# Patient Record
Sex: MALE | Race: WHITE | Employment: STUDENT | URBAN - METROPOLITAN AREA
[De-identification: names, ages, dates, MRNs, and addresses within clinical notes are randomized per-mention and may not be internally consistent; named-entity substitution may affect disease eponyms.]

---

## 2024-05-06 ENCOUNTER — APPOINTMENT (OUTPATIENT)
Dept: GENERAL RADIOLOGY | Facility: HOSPITAL | Age: 19
End: 2024-05-06
Attending: PEDIATRICS
Payer: COMMERCIAL

## 2024-05-06 ENCOUNTER — HOSPITAL ENCOUNTER (EMERGENCY)
Facility: HOSPITAL | Age: 19
Discharge: HOME | End: 2024-05-06
Attending: PEDIATRICS
Payer: COMMERCIAL

## 2024-05-06 VITALS
HEART RATE: 70 BPM | HEIGHT: 67 IN | WEIGHT: 135 LBS | OXYGEN SATURATION: 100 % | BODY MASS INDEX: 21.19 KG/M2 | RESPIRATION RATE: 16 BRPM | DIASTOLIC BLOOD PRESSURE: 81 MMHG | SYSTOLIC BLOOD PRESSURE: 142 MMHG | TEMPERATURE: 98 F

## 2024-05-06 DIAGNOSIS — R06.00 DYSPNEA, UNSPECIFIED TYPE: Primary | ICD-10-CM

## 2024-05-06 LAB
ALBUMIN SERPL-MCNC: 4.7 G/DL (ref 3.4–5)
ALBUMIN/GLOB SERPL: 1.3 {RATIO} (ref 1–2)
ALP LIVER SERPL-CCNC: 76 U/L
ALT SERPL-CCNC: 18 U/L
ANION GAP SERPL CALC-SCNC: 7 MMOL/L (ref 0–18)
AST SERPL-CCNC: 18 U/L (ref 15–37)
BASOPHILS # BLD AUTO: 0.04 X10(3) UL (ref 0–0.2)
BASOPHILS NFR BLD AUTO: 0.4 %
BILIRUB SERPL-MCNC: 0.7 MG/DL (ref 0.1–2)
BUN BLD-MCNC: 12 MG/DL (ref 9–23)
CALCIUM BLD-MCNC: 9.7 MG/DL (ref 8.5–10.1)
CHLORIDE SERPL-SCNC: 110 MMOL/L (ref 98–112)
CO2 SERPL-SCNC: 22 MMOL/L (ref 21–32)
CREAT BLD-MCNC: 0.91 MG/DL
D DIMER PPP FEU-MCNC: <0.27 UG/ML FEU (ref ?–0.5)
EGFRCR SERPLBLD CKD-EPI 2021: 124 ML/MIN/1.73M2 (ref 60–?)
EOSINOPHIL # BLD AUTO: 0.15 X10(3) UL (ref 0–0.7)
EOSINOPHIL NFR BLD AUTO: 1.6 %
ERYTHROCYTE [DISTWIDTH] IN BLOOD BY AUTOMATED COUNT: 12 %
FLUAV + FLUBV RNA SPEC NAA+PROBE: NEGATIVE
FLUAV + FLUBV RNA SPEC NAA+PROBE: NEGATIVE
GLOBULIN PLAS-MCNC: 3.5 G/DL (ref 2.8–4.4)
GLUCOSE BLD-MCNC: 104 MG/DL (ref 70–99)
HCT VFR BLD AUTO: 45.7 %
HGB BLD-MCNC: 15.7 G/DL
IMM GRANULOCYTES # BLD AUTO: 0.01 X10(3) UL (ref 0–1)
IMM GRANULOCYTES NFR BLD: 0.1 %
LYMPHOCYTES # BLD AUTO: 3.63 X10(3) UL (ref 1–4)
LYMPHOCYTES NFR BLD AUTO: 38.9 %
MCH RBC QN AUTO: 29 PG (ref 26–34)
MCHC RBC AUTO-ENTMCNC: 34.4 G/DL (ref 31–37)
MCV RBC AUTO: 84.5 FL
MONOCYTES # BLD AUTO: 0.69 X10(3) UL (ref 0.1–1)
MONOCYTES NFR BLD AUTO: 7.4 %
NEUTROPHILS # BLD AUTO: 4.81 X10 (3) UL (ref 1.5–7.7)
NEUTROPHILS # BLD AUTO: 4.81 X10(3) UL (ref 1.5–7.7)
NEUTROPHILS NFR BLD AUTO: 51.6 %
OSMOLALITY SERPL CALC.SUM OF ELEC: 288 MOSM/KG (ref 275–295)
PLATELET # BLD AUTO: 293 10(3)UL (ref 150–450)
POTASSIUM SERPL-SCNC: 3.4 MMOL/L (ref 3.5–5.1)
PROT SERPL-MCNC: 8.2 G/DL (ref 6.4–8.2)
RBC # BLD AUTO: 5.41 X10(6)UL
RSV RNA SPEC NAA+PROBE: NEGATIVE
SARS-COV-2 RNA RESP QL NAA+PROBE: NOT DETECTED
SODIUM SERPL-SCNC: 139 MMOL/L (ref 136–145)
TROPONIN I SERPL HS-MCNC: <3 NG/L
WBC # BLD AUTO: 9.3 X10(3) UL (ref 4–11)

## 2024-05-06 PROCEDURE — 84484 ASSAY OF TROPONIN QUANT: CPT | Performed by: PEDIATRICS

## 2024-05-06 PROCEDURE — 99285 EMERGENCY DEPT VISIT HI MDM: CPT

## 2024-05-06 PROCEDURE — 85025 COMPLETE CBC W/AUTO DIFF WBC: CPT | Performed by: PEDIATRICS

## 2024-05-06 PROCEDURE — 80053 COMPREHEN METABOLIC PANEL: CPT | Performed by: PEDIATRICS

## 2024-05-06 PROCEDURE — 93005 ELECTROCARDIOGRAM TRACING: CPT

## 2024-05-06 PROCEDURE — 36415 COLL VENOUS BLD VENIPUNCTURE: CPT

## 2024-05-06 PROCEDURE — 0241U SARS-COV-2/FLU A AND B/RSV BY PCR (GENEXPERT): CPT | Performed by: PEDIATRICS

## 2024-05-06 PROCEDURE — 85379 FIBRIN DEGRADATION QUANT: CPT | Performed by: PEDIATRICS

## 2024-05-06 PROCEDURE — 71046 X-RAY EXAM CHEST 2 VIEWS: CPT | Performed by: PEDIATRICS

## 2024-05-06 PROCEDURE — 93010 ELECTROCARDIOGRAM REPORT: CPT

## 2024-05-06 PROCEDURE — 99284 EMERGENCY DEPT VISIT MOD MDM: CPT

## 2024-05-07 LAB
ATRIAL RATE: 69 BPM
P AXIS: 63 DEGREES
P-R INTERVAL: 124 MS
Q-T INTERVAL: 396 MS
QRS DURATION: 96 MS
QTC CALCULATION (BEZET): 424 MS
R AXIS: 80 DEGREES
T AXIS: 33 DEGREES
VENTRICULAR RATE: 69 BPM

## 2024-05-07 NOTE — ED PROVIDER NOTES
Patient Seen in: [facility] Emergency Department      History     Chief Complaint   Patient presents with    Difficulty Breathing     Stated Complaint: ERICK    Subjective:   18-year-old healthy male presents with 1 and half weeks of progressively worsening intermittent dyspnea and shortness of breath.  Patient also endorses some mild intermittent dizziness however denies any significant chest pain, palpitations, cough fevers, extremity swelling or rash.  Patient has also been fatigued.  Was seen at immediate care a few days ago and reportedly had an unremarkable chest x-ray.  Due to persistent symptoms patient was brought to the emergency department.  No history of wheezing or albuterol use.  Patient also denies any specific stressors.            Objective:   History reviewed. No pertinent past medical history.           History reviewed. No pertinent surgical history.             Social History     Socioeconomic History    Marital status: Single   Tobacco Use    Smoking status: Never    Smokeless tobacco: Never              Review of Systems   Constitutional:  Positive for fatigue. Negative for fever.   HENT:  Negative for congestion.    Eyes:  Negative for photophobia and visual disturbance.   Respiratory:  Positive for shortness of breath. Negative for cough.    Cardiovascular:  Negative for chest pain, palpitations and leg swelling.   Gastrointestinal:  Negative for abdominal pain.   Musculoskeletal:  Negative for joint swelling.   Skin:  Negative for rash.   Allergic/Immunologic: Negative for immunocompromised state.   Neurological:  Positive for light-headedness. Negative for dizziness.   Hematological:  Does not bruise/bleed easily.       Positive for stated complaint: ERICK  Other systems are as noted in HPI.  Constitutional and vital signs reviewed.      All other systems reviewed and negative except as noted above.    Physical Exam     ED Triage Vitals [05/06/24 1930]   /81   Pulse 70   Resp 16    Temp 98.3 °F (36.8 °C)   Temp src Temporal   SpO2 100 %   O2 Device None (Room air)       Current:/81   Pulse 70   Temp 98.3 °F (36.8 °C) (Temporal)   Resp 16   Ht 170.2 cm (5' 7\")   Wt 61.2 kg   SpO2 100%   BMI 21.14 kg/m²         Physical Exam  Vitals and nursing note reviewed.   Constitutional:       General: He is not in acute distress.     Appearance: Normal appearance. He is not ill-appearing, toxic-appearing or diaphoretic.      Comments: Afebrile, well-appearing in no apparent distress   HENT:      Head: Normocephalic and atraumatic.      Nose: Nose normal.      Mouth/Throat:      Mouth: Mucous membranes are moist.      Pharynx: Oropharynx is clear.   Eyes:      Extraocular Movements: Extraocular movements intact.      Conjunctiva/sclera: Conjunctivae normal.      Pupils: Pupils are equal, round, and reactive to light.   Cardiovascular:      Rate and Rhythm: Normal rate and regular rhythm.      Pulses: Normal pulses.      Heart sounds: Normal heart sounds.   Pulmonary:      Effort: Pulmonary effort is normal. No respiratory distress.      Breath sounds: Normal breath sounds. No wheezing.      Comments: Respiratory rate 16, sats 100% in room air, no retractions or wheezes  Abdominal:      General: Abdomen is flat. Bowel sounds are normal.      Palpations: Abdomen is soft.      Tenderness: There is no abdominal tenderness.   Musculoskeletal:         General: Normal range of motion.      Cervical back: Normal range of motion and neck supple. No rigidity.   Skin:     General: Skin is warm.      Capillary Refill: Capillary refill takes less than 2 seconds.   Neurological:      General: No focal deficit present.      Mental Status: He is alert and oriented to person, place, and time.      Cranial Nerves: No cranial nerve deficit.      Sensory: No sensory deficit.           ED Course     Labs Reviewed   COMP METABOLIC PANEL (14) - Abnormal; Notable for the following components:       Result Value     Glucose 104 (*)     Potassium 3.4 (*)     All other components within normal limits   TROPONIN I HIGH SENSITIVITY - Normal   D-DIMER - Normal   SARS-COV-2/FLU A AND B/RSV BY PCR (GENEXPERT) - Normal    Narrative:     This test is intended for the qualitative detection and differentiation of SARS-CoV-2, influenza A, influenza B, and respiratory syncytial virus (RSV) viral RNA in nasopharyngeal or nares swabs from individuals suspected of respiratory viral infection consistent with COVID-19 by their healthcare provider. Signs and symptoms of respiratory viral infection due to SARS-CoV-2, influenza, and RSV can be similar.    Test performed using the Xpert Xpress SARS-CoV-2/FLU/RSV (real time RT-PCR)  assay on the [employer] instrument, [employer], [place] [address].   This test is being used under the Food and Drug Administration's Emergency Use Authorization.    The authorized Fact Sheet for Healthcare Providers for this assay is available upon request from the laboratory.   CBC WITH DIFFERENTIAL WITH PLATELET    Narrative:     The following orders were created for panel order CBC With Differential With Platelet.  Procedure                               Abnormality         Status                     ---------                               -----------         ------                     CBC W/ DIFFERENTIAL[[id number]]                              Final result                 Please view results for these tests on the individual orders.   CBC W/ DIFFERENTIAL     EKG    Rate, intervals and axes as noted on EKG Report.  Rate: 69  Rhythm: Sinus Rhythm  Reading: Normal sinus rhythm, no ST elevation, QTc 424              ED Course as of 05/06/24 2104  ------------------------------------------------------------  Time: 05/06 1958  Comment: CXR without pneumothorax or pneumonia  ------------------------------------------------------------  Time: 05/06 2041  Comment: Labs all grossly unremarkable.  Patient remains very  well-appearing with reassuring physical exam and vital signs.  Will discharge home to follow-up with cardiology and PCP.  Instructions when to seek emergent care for worsening symptoms provided.     Assessment & Plan: Very well-appearing with nonspecific dyspnea/shortness of breath.  Physical exam and vital signs reassuring.  Will obtain chest x-ray, EKG lab work.     Independent historian: Mother   Pertinent co-morbidities affecting presentation: None   Differential diagnoses considered: I considered various etiologies / differetial diagosis including but not limited to, nonspecific dyspnea, anxiety, less likely pericarditis, myocarditis, PE or pneumothorax. The patient was well-appearing and did not show any evidence of serious bacterial infection.  Diagnostic tests considered but not performed: Chest CT -low concern for PE    ED Course:    Prescription drug management considerations:   Consideration regarding hospitalization or escalation of care: None at this time  Social determinants of health: None       I have considered other serious etiologies for this patient's complaints, however the presentation is not consistent with such entities. Patient was screened and evaluated during this visit.   As a treating physician attending to the patient, I determined, within reasonable clinical confidence and prior to discharge, that an emergency medical condition was not or was no longer present. Patient or caregiver understands the course of events that occurred in the emergency department. Instructions when to seek emergent medical care was reviewed. Advised parent or caregiver to follow up with primary care physician.        This report has been produced using speech recognition software and may contain errors related to that system including, but not limited to, errors in grammar, punctuation, and spelling, as well as words and phrases that possibly may have been recognized inappropriately.  If there are any  questions or concerns, contact the dictating provider for clarification.           [facility]    Radiology:  Imaging ordered independently visualized and interpreted by myself (along with review of radiologist's interpretation) and noted the following: CXR without focal consolidation or pneumothroax    XR CHEST PA + LAT CHEST (CPT=71046)    Result Date: 5/6/2024  CONCLUSION:      Hyperinflation, and accentuation of central bronchovascular markings may reflect viral type inflammatory disease and/or reactive airways, but no sign of focal lobar type pneumonia.   LOCATION:  [id number]   Dictated by (CST): Rajesh Chowdhury MD on 5/06/2024 at 8:15 PM     Finalized by (CST): Rajesh Chowdhury MD on 5/06/2024 at 8:16 PM        Labs:  ^^ Personally ordered, reviewed, and interpreted all unique tests ordered.  Clinically significant labs noted: grossly unremarkable    Medications administered:  Medications - No data to display    Pulse oximetry:  Pulse oximetry on room air is 100% and is normal.     Cardiac monitoring:  Initial heart rate is 70 and is normal for age    Vital signs:  Vitals:    05/06/24 1930   BP: 142/81   Pulse: 70   Resp: 16   Temp: 98.3 °F (36.8 °C)   TempSrc: Temporal   SpO2: 100%   Weight: 61.2 kg   Height: 170.2 cm (5' 7\")       Chart review:  ^^ Review of prior external notes from unique sources (non-[facility] ED records): noted in history : Urgent care visit 5/3/2024 for dyspnea/shortness of breath    Disposition and Plan     Clinical Impression:  1. Dyspnea, unspecified type         Disposition:  Discharge  5/6/2024  9:04 pm    Follow-up:  [facility]  [address] [id number] [phone]  Schedule an appointment as soon as possible for a visit      [facility] Emergency Department  [address] [id number] [phone]  Follow up  If symptoms worsen          Medications Prescribed:  There are no discharge medications for this patient.

## 2024-05-07 NOTE — DISCHARGE INSTRUCTIONS
Call to follow-up with cardiology.  Follow-up with your primary care doctor.  Seek immediate medical care if you have significantly worsening shortness of breath, syncope, vomiting or any other major concerns.

## 2024-05-07 NOTE — ED INITIAL ASSESSMENT (HPI)
Pt arrives to ed w mom w c/o sob for a month. Pt reports sob getting worse within the last week. Seen at  on Friday and told everything looked fine.. Denies NVD or fevers. Denies sick family members

## 2024-05-15 ENCOUNTER — OFFICE VISIT (OUTPATIENT)
Dept: FAMILY MEDICINE CLINIC | Facility: CLINIC | Age: 19
End: 2024-05-15

## 2024-05-15 ENCOUNTER — LAB ENCOUNTER (OUTPATIENT)
Dept: LAB | Age: 19
End: 2024-05-15
Attending: FAMILY MEDICINE

## 2024-05-15 VITALS
SYSTOLIC BLOOD PRESSURE: 112 MMHG | OXYGEN SATURATION: 97 % | TEMPERATURE: 97 F | HEIGHT: 67 IN | DIASTOLIC BLOOD PRESSURE: 58 MMHG | WEIGHT: 126 LBS | HEART RATE: 85 BPM | BODY MASS INDEX: 19.78 KG/M2 | RESPIRATION RATE: 16 BRPM

## 2024-05-15 DIAGNOSIS — R06.02 SHORTNESS OF BREATH: ICD-10-CM

## 2024-05-15 DIAGNOSIS — R53.83 OTHER FATIGUE: ICD-10-CM

## 2024-05-15 DIAGNOSIS — R07.89 CHEST PRESSURE: ICD-10-CM

## 2024-05-15 LAB
T3 SERPL-MCNC: 94 NG/DL (ref 86–192)
T4 FREE SERPL-MCNC: 1.3 NG/DL (ref 0.8–1.7)
TSI SER-ACNC: 0.98 MIU/ML (ref 0.36–3.74)

## 2024-05-15 PROCEDURE — 99204 OFFICE O/P NEW MOD 45 MIN: CPT | Performed by: FAMILY MEDICINE

## 2024-05-15 PROCEDURE — 84439 ASSAY OF FREE THYROXINE: CPT

## 2024-05-15 PROCEDURE — 84443 ASSAY THYROID STIM HORMONE: CPT

## 2024-05-15 PROCEDURE — 84480 ASSAY TRIIODOTHYRONINE (T3): CPT

## 2024-05-15 RX ORDER — ALBUTEROL SULFATE 90 UG/1
2 AEROSOL, METERED RESPIRATORY (INHALATION) EVERY 4 HOURS PRN
Qty: 1 EACH | Refills: 0 | Status: SHIPPED | OUTPATIENT
Start: 2024-05-15

## 2024-05-15 NOTE — PROGRESS NOTES
[facility] Family Medicine Office Note  Chief Complaint:   Chief Complaint   Patient presents with    Fatigue     Pt c/o fatigue and Shortness of breath for about 3 weeks.    Shortness Of Breath       HPI:   This is a 18 year old male coming in for episodes of fatigue as well as shortness of breath chest pressure the patient states that this has been going on for the last 3 weeks.  He states that he has not had any other symptoms associated with this he states the chest pressure does not radiate anywhere.  These episodes have been happening after physical activity.  He recently did follow-up in urgent care had blood work completed as well as chest x-ray no acute findings.      No past medical history on file.  No past surgical history on file.  Social History:  Social History     Socioeconomic History    Marital status: Single   Tobacco Use    Smoking status: Never    Smokeless tobacco: Never     Family History:  Family History   Problem Relation Age of Onset    Heart Disease Maternal Grandfather     Cancer Maternal Grandfather      Allergies:  Allergies   Allergen Reactions    Penicillins OTHER (SEE COMMENTS)     Current Meds:  Current Outpatient Medications   Medication Sig Dispense Refill    albuterol 108 (90 Base) MCG/ACT Inhalation Aero Soln Inhale 2 puffs into the lungs every 4 (four) hours as needed for Wheezing. 1 each 0      Counseling given: Not Answered       REVIEW OF SYSTEMS:   Consitutional: No fevers, chills, sweats  Eye: No recent visual problems  ENMT: No ear pain nasal congestion sore throat  Respiratory: Positive shortness of breath, no cough  Cardiovascular: No chest pain palpitations syncope  Gastrointestinal: No nausea vomiting diarrhea  Genitourinary: No hematuria  Hema/Lymph no bruising tendency, swollen lymph glands  Endocrine: Negative for excessive thirst excessive hunger  Musculoskeletal: No back pain neck pain joint pain muscle pain decreased range of motion       Medical,  surgical, family, and social histories were reviewed      EXAM:   VITALS:   Vitals:    05/15/24 1029   BP: 112/58   Pulse: 85   Resp: 16   Temp: 97 °F (36.1 °C)      GENERAL: well developed, well nourished, in no apparent distress  SKIN: no rashes, no suspicious lesions: Cool and Dry  HEENT: atraumatic, normocephalic, ears and throat are clear    Ears: TM's clear and visible bilaterally, no excess cerumen or erythema.   EYES: Pupils equal round and reactive.  Extraocular motions intact no scleral icterus no injection or drainage  THROAT without erythema tonsillar hypertrophy or exudate.  Uvula midline airway patent  NECK: Given midline.  No JVD or lymphadenopathy supple nontender no meningeal signs   LUNGS: clear to auscultation sounds equal bilaterally no wheezes rales or rhonchi  CARDIO: Regular rate and rhythm without murmurs gallops or rubs          ASSESSMENT AND PLAN:   1. Chest pressure  - CARD TREADMILL STRESS, ADULT (CPT=93017); Future  I did discuss with the patient this time with suggest completing a stress test to discern if there is any abnormalities with the heart.  He was asked to schedule this was asked to follow-up in the emergency room if he does have any increasing chest pain or discomfort.  2. Other fatigue  - Triiodothyronine (T3) Total; Future  - TSH and Free T4; Future  Did discuss with the patient like to update blood work and need a TSH T3-T4  3. Shortness of breath  - albuterol 108 (90 Base) MCG/ACT Inhalation Aero Soln; Inhale 2 puffs into the lungs every 4 (four) hours as needed for Wheezing.  Dispense: 1 each; Refill: 0  Did discuss with the patient as well we would be sending in a prescription for albuterol he was asked to use this as needed for any episodes of feeling short of breath after activity.    Meds & Refills for this Visit:  Requested Prescriptions     Signed Prescriptions Disp Refills    albuterol 108 (90 Base) MCG/ACT Inhalation Aero Soln 1 each 0     Sig: Inhale 2 puffs  into the lungs every 4 (four) hours as needed for Wheezing.       Health Maintenance:  Health Maintenance Due   Topic Date Due    Annual Physical  Never done    COVID-19 Vaccine (1 - 2023-24 season) Never done    Annual Depression Screening  01/01/2024       Patient/Caregiver Education: Patient/Caregiver Education: There are no barriers to learning. Medical education done.   Outcome: Patient verbalizes understanding. Patient is notified to call with any questions, complications, allergies, or worsening or changing symptoms.  Patient is to call with any side effects or complications from the treatments as a result of today.     Problem List:  Patient Active Problem List   Diagnosis    Acne vulgaris         No follow-ups on file.     John Gibbs MD    Please note that portions of this note may have been completed with a voice recognition program. Efforts were made to edit the dictations but occasionally words are mis-transcribed.

## 2024-05-18 ENCOUNTER — HOSPITAL ENCOUNTER (OUTPATIENT)
Dept: CV DIAGNOSTICS | Facility: HOSPITAL | Age: 19
Discharge: HOME | End: 2024-05-18
Attending: FAMILY MEDICINE

## 2024-05-18 DIAGNOSIS — R07.89 CHEST PRESSURE: ICD-10-CM

## 2024-05-18 PROCEDURE — 93017 CV STRESS TEST TRACING ONLY: CPT | Performed by: FAMILY MEDICINE

## 2024-05-18 PROCEDURE — 93018 CV STRESS TEST I&R ONLY: CPT | Performed by: FAMILY MEDICINE

## 2024-05-22 ENCOUNTER — HOSPITAL ENCOUNTER (EMERGENCY)
Facility: HOSPITAL | Age: 19
Discharge: HOME | End: 2024-05-22
Attending: EMERGENCY MEDICINE

## 2024-05-22 ENCOUNTER — OFFICE VISIT (OUTPATIENT)
Dept: FAMILY MEDICINE CLINIC | Facility: CLINIC | Age: 19
End: 2024-05-22

## 2024-05-22 ENCOUNTER — APPOINTMENT (OUTPATIENT)
Dept: GENERAL RADIOLOGY | Facility: HOSPITAL | Age: 19
End: 2024-05-22
Attending: EMERGENCY MEDICINE

## 2024-05-22 VITALS
DIASTOLIC BLOOD PRESSURE: 56 MMHG | RESPIRATION RATE: 18 BRPM | HEART RATE: 105 BPM | TEMPERATURE: 97 F | BODY MASS INDEX: 19.78 KG/M2 | HEIGHT: 67 IN | SYSTOLIC BLOOD PRESSURE: 90 MMHG | WEIGHT: 126 LBS

## 2024-05-22 VITALS
TEMPERATURE: 98 F | SYSTOLIC BLOOD PRESSURE: 128 MMHG | HEART RATE: 75 BPM | RESPIRATION RATE: 16 BRPM | OXYGEN SATURATION: 100 % | DIASTOLIC BLOOD PRESSURE: 85 MMHG

## 2024-05-22 DIAGNOSIS — R00.2 HEART PALPITATIONS: Primary | ICD-10-CM

## 2024-05-22 DIAGNOSIS — R00.2 PALPITATIONS: Primary | ICD-10-CM

## 2024-05-22 LAB
ALBUMIN SERPL-MCNC: 4.4 G/DL (ref 3.4–5)
ALBUMIN/GLOB SERPL: 1.3 {RATIO} (ref 1–2)
ALP LIVER SERPL-CCNC: 69 U/L
ALT SERPL-CCNC: 19 U/L
ANION GAP SERPL CALC-SCNC: 7 MMOL/L (ref 0–18)
AST SERPL-CCNC: 16 U/L (ref 15–37)
ATRIAL RATE: 71 BPM
BASOPHILS # BLD AUTO: 0.05 X10(3) UL (ref 0–0.2)
BASOPHILS NFR BLD AUTO: 0.6 %
BILIRUB SERPL-MCNC: 0.7 MG/DL (ref 0.1–2)
BUN BLD-MCNC: 13 MG/DL (ref 9–23)
CALCIUM BLD-MCNC: 9.3 MG/DL (ref 8.5–10.1)
CHLORIDE SERPL-SCNC: 110 MMOL/L (ref 98–112)
CO2 SERPL-SCNC: 22 MMOL/L (ref 21–32)
CREAT BLD-MCNC: 0.91 MG/DL
EGFRCR SERPLBLD CKD-EPI 2021: 124 ML/MIN/1.73M2 (ref 60–?)
EOSINOPHIL # BLD AUTO: 0.33 X10(3) UL (ref 0–0.7)
EOSINOPHIL NFR BLD AUTO: 4.1 %
ERYTHROCYTE [DISTWIDTH] IN BLOOD BY AUTOMATED COUNT: 12 %
FLUAV + FLUBV RNA SPEC NAA+PROBE: NEGATIVE
FLUAV + FLUBV RNA SPEC NAA+PROBE: NEGATIVE
GLOBULIN PLAS-MCNC: 3.3 G/DL (ref 2.8–4.4)
GLUCOSE BLD-MCNC: 93 MG/DL (ref 70–99)
HCT VFR BLD AUTO: 41.6 %
HGB BLD-MCNC: 14.9 G/DL
IMM GRANULOCYTES # BLD AUTO: 0.01 X10(3) UL (ref 0–1)
IMM GRANULOCYTES NFR BLD: 0.1 %
LYMPHOCYTES # BLD AUTO: 4.13 X10(3) UL (ref 1–4)
LYMPHOCYTES NFR BLD AUTO: 51.6 %
MCH RBC QN AUTO: 29.7 PG (ref 26–34)
MCHC RBC AUTO-ENTMCNC: 35.8 G/DL (ref 31–37)
MCV RBC AUTO: 82.9 FL
MONOCYTES # BLD AUTO: 0.6 X10(3) UL (ref 0.1–1)
MONOCYTES NFR BLD AUTO: 7.5 %
NEUTROPHILS # BLD AUTO: 2.88 X10 (3) UL (ref 1.5–7.7)
NEUTROPHILS # BLD AUTO: 2.88 X10(3) UL (ref 1.5–7.7)
NEUTROPHILS NFR BLD AUTO: 36.1 %
OSMOLALITY SERPL CALC.SUM OF ELEC: 288 MOSM/KG (ref 275–295)
P AXIS: 61 DEGREES
P-R INTERVAL: 128 MS
PLATELET # BLD AUTO: 241 10(3)UL (ref 150–450)
POTASSIUM SERPL-SCNC: 3.6 MMOL/L (ref 3.5–5.1)
PROT SERPL-MCNC: 7.7 G/DL (ref 6.4–8.2)
Q-T INTERVAL: 400 MS
QRS DURATION: 96 MS
QTC CALCULATION (BEZET): 434 MS
R AXIS: 79 DEGREES
RBC # BLD AUTO: 5.02 X10(6)UL
RSV RNA SPEC NAA+PROBE: NEGATIVE
SARS-COV-2 RNA RESP QL NAA+PROBE: NOT DETECTED
SODIUM SERPL-SCNC: 139 MMOL/L (ref 136–145)
T AXIS: 54 DEGREES
TROPONIN I SERPL HS-MCNC: 3 NG/L
VENTRICULAR RATE: 71 BPM
WBC # BLD AUTO: 8 X10(3) UL (ref 4–11)

## 2024-05-22 PROCEDURE — 99285 EMERGENCY DEPT VISIT HI MDM: CPT

## 2024-05-22 PROCEDURE — 85025 COMPLETE CBC W/AUTO DIFF WBC: CPT | Performed by: EMERGENCY MEDICINE

## 2024-05-22 PROCEDURE — 80053 COMPREHEN METABOLIC PANEL: CPT | Performed by: EMERGENCY MEDICINE

## 2024-05-22 PROCEDURE — 99214 OFFICE O/P EST MOD 30 MIN: CPT | Performed by: FAMILY MEDICINE

## 2024-05-22 PROCEDURE — 93010 ELECTROCARDIOGRAM REPORT: CPT

## 2024-05-22 PROCEDURE — 93005 ELECTROCARDIOGRAM TRACING: CPT

## 2024-05-22 PROCEDURE — 36415 COLL VENOUS BLD VENIPUNCTURE: CPT

## 2024-05-22 PROCEDURE — 99284 EMERGENCY DEPT VISIT MOD MDM: CPT

## 2024-05-22 PROCEDURE — 0241U SARS-COV-2/FLU A AND B/RSV BY PCR (GENEXPERT): CPT | Performed by: EMERGENCY MEDICINE

## 2024-05-22 PROCEDURE — 71045 X-RAY EXAM CHEST 1 VIEW: CPT | Performed by: EMERGENCY MEDICINE

## 2024-05-22 PROCEDURE — 84484 ASSAY OF TROPONIN QUANT: CPT | Performed by: EMERGENCY MEDICINE

## 2024-05-22 NOTE — PROGRESS NOTES
[facility] Family Medicine Office Note  Chief Complaint:   Chief Complaint   Patient presents with    Test Results     Stress test completed on 05/18/2024       HPI:   This is a 18 year old male coming in for continued heart palpitations.  The patient was recently in the emergency room due to similar episode.  He states he does not have any chest pain.  States that he was just laying in bed when it started to occur.  Patient recently did have a stress test completed.      No past medical history on file.  No past surgical history on file.  Social History:  Social History     Socioeconomic History    Marital status: Single   Tobacco Use    Smoking status: Never    Smokeless tobacco: Never     Family History:  Family History   Problem Relation Age of Onset    Heart Disease Maternal Grandfather     Cancer Maternal Grandfather      Allergies:  Allergies   Allergen Reactions    Penicillins OTHER (SEE COMMENTS)     Current Meds:  Current Outpatient Medications   Medication Sig Dispense Refill    albuterol 108 (90 Base) MCG/ACT Inhalation Aero Soln Inhale 2 puffs into the lungs every 4 (four) hours as needed for Wheezing. 1 each 0      Counseling given: Not Answered       REVIEW OF SYSTEMS:   Consitutional: No fevers, chills, sweats  Eye: No recent visual problems  ENMT: No ear pain nasal congestion sore throat  Respiratory: No shortness of breath, cough  Cardiovascular: No chest pain positive palpitations no syncope  Gastrointestinal: No nausea vomiting diarrhea  Genitourinary: No hematuria  Hema/Lymph no bruising tendency, swollen lymph glands  Endocrine: Negative for excessive thirst excessive hunger       Medical, surgical, family, and social histories were reviewed      EXAM:   VITALS:   Vitals:    05/22/24 1320   BP: 90/56   Pulse: 105   Resp: 18   Temp: 97 °F (36.1 °C)      GENERAL: well developed, well nourished, in no apparent distress  SKIN: no rashes, no suspicious lesions: Cool and Dry  HEENT:  atraumatic, normocephalic, ears and throat are clear    Ears: TM's clear and visible bilaterally, no excess cerumen or erythema.   EYES: Pupils equal round and reactive.  Extraocular motions intact no scleral icterus no injection or drainage  THROAT without erythema tonsillar hypertrophy or exudate.  Uvula midline airway patent  NECK: Given midline.  No JVD or lymphadenopathy supple nontender no meningeal signs   LUNGS: clear to auscultation sounds equal bilaterally no wheezes rales or rhonchi  CARDIO: Regular rate and rhythm without murmurs gallops or rubs       ASSESSMENT AND PLAN:   1. Heart palpitations  - Cardio Referral - Internal  - CARD MONITOR 30 DAY EVENT; Future  Did discuss with the patient that the stress test was nondiagnostic did demonstrate nonspecific ST changes.  I did discuss her like for him to follow-up with cardiology for further recommendations.  We did also place an order for a cardiac monitor was asked to schedule this as well and to follow-up in the emergency room if he does have any other episodes of chest discomfort.    Meds & Refills for this Visit:  Requested Prescriptions      No prescriptions requested or ordered in this encounter       Health Maintenance:  Health Maintenance Due   Topic Date Due    Annual Physical  Never done    COVID-19 Vaccine (1 - 2023-24 season) Never done    Annual Depression Screening  01/01/2024       Patient/Caregiver Education: Patient/Caregiver Education: There are no barriers to learning. Medical education done.   Outcome: Patient verbalizes understanding. Patient is notified to call with any questions, complications, allergies, or worsening or changing symptoms.  Patient is to call with any side effects or complications from the treatments as a result of today.     Problem List:  Patient Active Problem List   Diagnosis    Acne vulgaris         No follow-ups on file.     John Gibbs MD    Please note that portions of this note may have been  completed with a voice recognition program. Efforts were made to edit the dictations but occasionally words are mis-transcribed.

## 2024-05-22 NOTE — ED INITIAL ASSESSMENT (HPI)
Pt to ED with c/o palpitations that started last night at 9pm. Pt states that they happened while he was just laying in bed and had gone away for a while but came back pta. Pt states currently no feeling of palpitations. Denies shortness of breath or chest pain. Recent stress test. Pt appears to be in no apparent distress, respirations even and non labored.

## 2024-05-22 NOTE — ED PROVIDER NOTES
Patient Seen in: [facility] Emergency Department      History     Chief Complaint   Patient presents with    Arrythmia/Palpitations    Shortness Of Breath     Stated Complaint: fast hr, sob, 103hr, 99% ra    Subjective:   HPI    18-year-old male presents emergency room for evaluation of palpitations, states over the last month he has had intermittent palpitations, feels some shortness of breath when he exerts himself.  Denied around 915 he had an episode where he felt palpitations for about 15 seconds.  States he then had difficulty sleeping therefore came to the emergency room.  Patient states that he has felt some shortness of breath with exertion over the last month or 2, denies tearing type chest or abdominal pain denies any pleuritic type chest pain.  Denies lower extremity swelling or calf pain denies PND orthopnea.  Denies DVT or PE risk factors.  Patient did have outpatient stress test on 5/18/2024 for the symptoms, on review of medical records stress test revealed patient exercised 10 minutes on the Reynaldo protocol, EKG response was nondiagnostic.  Denies ever having syncope.  Patient's nurse at bedside    Objective:   No pertinent past medical history.            No pertinent past surgical history.              No pertinent social history.            Review of Systems    Positive for stated complaint: fast hr, sob, 103hr, 99% ra  Other systems are as noted in HPI.  Constitutional and vital signs reviewed.      All other systems reviewed and negative except as noted above.    Physical Exam     ED Triage Vitals [05/22/24 0320]   /85   Pulse 75   Resp 16   Temp 98.4 °F (36.9 °C)   Temp src    SpO2 100 %   O2 Device None (Room air)       Current Vitals:   Vital Signs  BP: 128/85  Pulse: 75  Resp: 16  Temp: 98.4 °F (36.9 °C)    Oxygen Therapy  SpO2: 100 %  O2 Device: None (Room air)            Physical Exam    GENERAL: Patient is awake, alert, well-appearing, in no acute distress.  HEENT:  no  scleral icterus.  Mucous membranes are moist, oropharynx is clear, uvula midline.   HEART: Regular rate and rhythm, no murmurs.  LUNGS: Clear to auscultation bilaterally.  No Rales, no rhonchi, no wheezing, no stridor.  ABDOMEN: Soft, nondistended,non tender  EXTREMITIES: No peripheral edema, no calf tenderness,  ED Course     Labs Reviewed   CBC W/ DIFFERENTIAL - Abnormal; Notable for the following components:       Result Value    Lymphocyte Absolute 4.13 (*)     All other components within normal limits   COMP METABOLIC PANEL (14) - Normal   TROPONIN I HIGH SENSITIVITY - Normal   SARS-COV-2/FLU A AND B/RSV BY PCR (GENEXPERT) - Normal    Narrative:     This test is intended for the qualitative detection and differentiation of SARS-CoV-2, influenza A, influenza B, and respiratory syncytial virus (RSV) viral RNA in nasopharyngeal or nares swabs from individuals suspected of respiratory viral infection consistent with COVID-19 by their healthcare provider. Signs and symptoms of respiratory viral infection due to SARS-CoV-2, influenza, and RSV can be similar.    Test performed using the Xpert Xpress SARS-CoV-2/FLU/RSV (real time RT-PCR)  assay on the GeneXpert instrument, [employer], [place] [address].   This test is being used under the Food and Drug Administration's Emergency Use Authorization.    The authorized Fact Sheet for Healthcare Providers for this assay is available upon request from the laboratory.   CBC WITH DIFFERENTIAL WITH PLATELET    Narrative:     The following orders were created for panel order CBC With Differential With Platelet.  Procedure                               Abnormality         Status                     ---------                               -----------         ------                     CBC W/ DIFFERENTIAL[[id number]]          Abnormal            Final result                 Please view results for these tests on the individual orders.   RAINBOW DRAW LAVENDER   RAINBOW DRAW LIGHT  GREEN   RAINBOW DRAW BLUE   RAINBOW DRAW GOLD     EKG    Rate, intervals and axes as noted on EKG Report.  Rate: 71  Rhythm: Sinus Rhythm  Reading: Normal sinus rhythm no ST elevation               Chest x-rayComparison: May 6, 2024      IMPRESSION:    No acute abnormality    No infiltrate or pleural effusion or pneumothorax    Normal heart size    Visualized osseous structures appear normal for age           MDM        Differential diagnosis before testing includes but not limited to SVT, atrial fibrillation, PAC, PVC, electrolyte abnormality, which is a medical condition that poses a threat to life/function    Radiographic images  I personally reviewed the radiographs and my individual interpretation shows chest x-ray no pneumothorax  I also reviewed the official reports that showed chest x-ray no acute abnormality    External chart review included outpatient stress test reviewed as noted in HPI    Course of Events during Emergency Room Visit include patient placed on cardiac monitor and pulse ox.  CBC and chemistry were unremarkable troponin was negative.  Chest x-ray without acute findings.  Medical records reviewed including recent stress test.  Patient reports that he does not feel he is overly anxious or stressed, states he is on summer vacation from college.  Patient presents with palpitations, recommend follow-up with cardiology, possible Holter/event monitor.  Stay well-hydrated avoid caffeine or other stimulants.  Patient agrees with plan is well-appearing discharged good condition with family    Shared decision making was utilized         Discharge  I have discussed with the patient the results of test, differential diagnosis, treatment plan, warning signs and symptoms which should prompt immediate return.  They expressed understanding of these instructions and agrees to the following plan provided.  They were given written discharge instructions and agrees to return for any concerns and voiced  understanding and all questions were answered.    Note to patient: The 21st Century Cures Act makes medical notes like these available to patients in the interest of transparency. However, this is a medical document intended as peer to peer communication. It is written in medical language and may contain abbreviations or verbiage that are unfamiliar. It may appear blunt or direct. Medical documents are intended to carry relevant information, facts as evident, and the clinical opinion of the practitioner.                                            Medical Decision Making      Disposition and Plan     Clinical Impression:  1. Palpitations         Disposition:  Discharge  5/22/2024  4:51 am    Follow-up:  John Gibbs MD  [address] [place] [id number]  [phone]    Follow up in 2 day(s)      Abel Morton MD  [facility] [address] [address] [phone]    Call in 1 day(s)            Medications Prescribed:  Discharge Medication List as of 5/22/2024  4:52 AM

## 2024-05-30 ENCOUNTER — ORDER TRANSCRIPTION (OUTPATIENT)
Dept: ADMINISTRATIVE | Facility: HOSPITAL | Age: 19
End: 2024-05-30

## 2024-05-30 DIAGNOSIS — R07.9 CHEST PAIN: Primary | ICD-10-CM

## 2024-05-30 DIAGNOSIS — R00.2 HEART PALPITATIONS: ICD-10-CM

## 2024-05-30 DIAGNOSIS — R06.02 SOB (SHORTNESS OF BREATH): ICD-10-CM

## 2024-06-28 NOTE — IMAGING NOTE
0930- Pt called and instructed to arrive 45 minutes prior to CTA gated study on 7/2/24, park in the [facility] parking garage, eat a light breakfast/lunch, hydrate, hold caffeine/decaf/chocolate for 12 hours prior, hold long-acting nitrates but take all other meds, and take beta blockers night before at bedtime and 90 minutes prior to CTA as prescribed.  Pt verbalized understanding and denies further questions.

## 2024-07-02 ENCOUNTER — HOSPITAL ENCOUNTER (OUTPATIENT)
Dept: CT IMAGING | Facility: HOSPITAL | Age: 19
Discharge: HOME | End: 2024-07-02
Attending: INTERNAL MEDICINE
Payer: COMMERCIAL

## 2024-07-02 VITALS — RESPIRATION RATE: 14 BRPM | HEART RATE: 49 BPM | SYSTOLIC BLOOD PRESSURE: 109 MMHG | DIASTOLIC BLOOD PRESSURE: 67 MMHG

## 2024-07-02 VITALS — HEART RATE: 52 BPM | SYSTOLIC BLOOD PRESSURE: 97 MMHG | DIASTOLIC BLOOD PRESSURE: 62 MMHG

## 2024-07-02 DIAGNOSIS — R06.02 SOB (SHORTNESS OF BREATH): ICD-10-CM

## 2024-07-02 DIAGNOSIS — R07.9 CHEST PAIN: ICD-10-CM

## 2024-07-02 DIAGNOSIS — R00.2 HEART PALPITATIONS: ICD-10-CM

## 2024-07-02 LAB
CREAT BLD-MCNC: 0.9 MG/DL
EGFRCR SERPLBLD CKD-EPI 2021: 125 ML/MIN/1.73M2 (ref 60–?)

## 2024-07-02 PROCEDURE — 82565 ASSAY OF CREATININE: CPT

## 2024-07-02 PROCEDURE — 75574 CT ANGIO HRT W/3D IMAGE: CPT | Performed by: INTERNAL MEDICINE

## 2024-07-02 RX ORDER — NITROGLYCERIN 0.4 MG/1
TABLET SUBLINGUAL
Status: COMPLETED
Start: 2024-07-02 | End: 2024-07-02

## 2024-07-02 NOTE — IMAGING NOTE
Pt scheduled for CT gated coronary. Denies any contrast allergies. Denies taking Viagra, Cialis, Levitra or Imdur.  Room # 4 used for scanning.   O2 2L NC  HR 52 during scan at 0826   100 ml of 0.9 NS given.   75 ml of contrast given.   Tolerated exam well. Instructed to hydrate well to help clear contrast.

## 2024-07-10 ENCOUNTER — MED REC SCAN ONLY (OUTPATIENT)
Dept: FAMILY MEDICINE CLINIC | Facility: CLINIC | Age: 19
End: 2024-07-10

## 2024-07-18 DIAGNOSIS — R06.02 SHORTNESS OF BREATH: ICD-10-CM

## 2024-07-19 RX ORDER — ALBUTEROL SULFATE 90 UG/1
2 AEROSOL, METERED RESPIRATORY (INHALATION) EVERY 4 HOURS PRN
Qty: 1 EACH | Refills: 0 | Status: SHIPPED | OUTPATIENT
Start: 2024-07-19

## 2024-07-19 NOTE — TELEPHONE ENCOUNTER
Last Office Visit: 5/15/2024  Last Refill: 5/15/2024  Return to Clinic: n/a  Protocol: failed  NOV: n/a     Requested Prescriptions     Pending Prescriptions Disp Refills    albuterol 108 (90 Base) MCG/ACT Inhalation Aero Soln 1 each 0     Sig: Inhale 2 puffs into the lungs every 4 (four) hours as needed for Wheezing.       Please approve if appropriate.     Thank you!

## 2024-07-23 ENCOUNTER — OFFICE VISIT (OUTPATIENT)
Dept: FAMILY MEDICINE CLINIC | Facility: CLINIC | Age: 19
End: 2024-07-23
Payer: COMMERCIAL

## 2024-07-23 VITALS
WEIGHT: 121 LBS | SYSTOLIC BLOOD PRESSURE: 102 MMHG | BODY MASS INDEX: 18.34 KG/M2 | HEIGHT: 68 IN | HEART RATE: 75 BPM | DIASTOLIC BLOOD PRESSURE: 52 MMHG | RESPIRATION RATE: 16 BRPM | TEMPERATURE: 98 F

## 2024-07-23 DIAGNOSIS — K21.00 GASTROESOPHAGEAL REFLUX DISEASE WITH ESOPHAGITIS WITHOUT HEMORRHAGE: Primary | ICD-10-CM

## 2024-07-23 PROBLEM — R07.9 CHEST PAIN, UNSPECIFIED: Status: ACTIVE | Noted: 2024-05-30

## 2024-07-23 PROBLEM — R42 LIGHTHEADEDNESS: Status: ACTIVE | Noted: 2024-05-30

## 2024-07-23 PROBLEM — R06.02 SOB (SHORTNESS OF BREATH): Status: ACTIVE | Noted: 2024-05-30

## 2024-07-23 PROBLEM — R00.2 HEART PALPITATIONS: Status: ACTIVE | Noted: 2024-05-24

## 2024-07-23 PROCEDURE — 99214 OFFICE O/P EST MOD 30 MIN: CPT | Performed by: FAMILY MEDICINE

## 2024-07-23 RX ORDER — PANTOPRAZOLE SODIUM 40 MG/1
40 TABLET, DELAYED RELEASE ORAL
Qty: 90 TABLET | Refills: 0 | Status: SHIPPED | OUTPATIENT
Start: 2024-07-23 | End: 2024-10-21

## 2024-07-23 NOTE — PROGRESS NOTES
[facility] Family Medicine Office Note  Chief Complaint:   Chief Complaint   Patient presents with    Shortness Of Breath     Pt c/o SOB for about 3 months.    Gastro-esophageal Reflux    Burping    Throat Problem    Acid Base Problem       HPI:   This is a 18 year old male coming in for episodes of bloating burping reflux discomfort in the throat.  The patient states that this has been going on for the last month.  He states he has not been using anything over-the-counter.  He states that any food can cause the irritation.  He also states that he has had some continued shortness of breath for the last 3 months.  He states that he has been using the rescue inhaler sparingly due to increased heart palpitations.      No past medical history on file.  No past surgical history on file.  Social History:  Social History     Socioeconomic History    Marital status: Single   Tobacco Use    Smoking status: Never    Smokeless tobacco: Never     Family History:  Family History   Problem Relation Age of Onset    Heart Disease Maternal Grandfather     Cancer Maternal Grandfather      Allergies:  Allergies   Allergen Reactions    Penicillins OTHER (SEE COMMENTS)     Current Meds:  Current Outpatient Medications   Medication Sig Dispense Refill    metoprolol tartrate 25 MG Oral Tab Take 0.5 tablets (12.5 mg total) by mouth 2 (two) times daily.      pantoprazole 40 MG Oral Tab EC Take 1 tablet (40 mg total) by mouth every morning before breakfast. 90 tablet 0    albuterol 108 (90 Base) MCG/ACT Inhalation Aero Soln Inhale 2 puffs into the lungs every 4 (four) hours as needed for Wheezing. 1 each 0      Counseling given: Not Answered       REVIEW OF SYSTEMS:   Consitutional: No fevers, chills, sweats  Eye: No recent visual problems  ENMT: No ear pain nasal congestion sore throat  Respiratory: No shortness of breath, cough  Cardiovascular: No chest pain palpitations syncope  Gastrointestinal: No nausea vomiting  diarrhea  Genitourinary: No hematuria  Hema/Lymph no bruising tendency, swollen lymph glands  Endocrine: Negative for excessive thirst excessive hunger       Medical, surgical, family, and social histories were reviewed      EXAM:   VITALS:   Vitals:    07/23/24 0809   BP: 102/52   Pulse: 75   Resp: 16   Temp: 98.4 °F (36.9 °C)      GENERAL: well developed, well nourished, in no apparent distress  SKIN: no rashes, no suspicious lesions: Cool and Dry  HEENT: atraumatic, normocephalic, ears and throat are clear    Ears: TM's clear and visible bilaterally, no excess cerumen or erythema.   EYES: Pupils equal round and reactive.  Extraocular motions intact no scleral icterus no injection or drainage  THROAT without erythema tonsillar hypertrophy or exudate.  Uvula midline airway patent  NECK: Given midline.  No JVD or lymphadenopathy supple nontender no meningeal signs   LUNGS: clear to auscultation sounds equal bilaterally no wheezes rales or rhonchi  CARDIO: Regular rate and rhythm without murmurs gallops or rubs  GI: Soft nontender nondistended no hepatomegaly palpable masses.  No guarding.       ASSESSMENT AND PLAN:   1. Gastroesophageal reflux disease with esophagitis without hemorrhage  - pantoprazole 40 MG Oral Tab EC; Take 1 tablet (40 mg total) by mouth every morning before breakfast.  Dispense: 90 tablet; Refill: 0  - Gastro Referral - In Network  I did discuss with the patient that the shortness of breath may be associated to reflux.  As well as the burning sensation and upset stomach that he has been having may be overproduction of acid.  He was given a prescription for pantoprazole to be used once a day.  Will also be referring him to gastroenterology for an endoscopy I did discuss if he continues to have symptoms of shortness of breath we may look into completing a pulmonary function test and/or referring to pulmonology.    Meds & Refills for this Visit:  Requested Prescriptions     Signed Prescriptions  Disp Refills    pantoprazole 40 MG Oral Tab EC 90 tablet 0     Sig: Take 1 tablet (40 mg total) by mouth every morning before breakfast.       Health Maintenance:  Health Maintenance Due   Topic Date Due    Annual Physical  Never done    COVID-19 Vaccine (1 - 2023-24 season) Never done    Annual Depression Screening  01/01/2024       Patient/Caregiver Education: Patient/Caregiver Education: There are no barriers to learning. Medical education done.   Outcome: Patient verbalizes understanding. Patient is notified to call with any questions, complications, allergies, or worsening or changing symptoms.  Patient is to call with any side effects or complications from the treatments as a result of today.     Problem List:  Patient Active Problem List   Diagnosis    Acne vulgaris    SOB (shortness of breath)    Lightheadedness    Heart palpitations    Chest pain, unspecified         No follow-ups on file.     John Gibbs MD    Please note that portions of this note may have been completed with a voice recognition program. Efforts were made to edit the dictations but occasionally words are mis-transcribed.

## 2024-08-19 NOTE — TELEPHONE ENCOUNTER
4.0 1/25 Medication previously prescribed by an outside provider. [employer] message sent to pt informing of this and told patient to reach the office of the previous provider. Denied medication.

## 2024-09-09 ENCOUNTER — TELEPHONE (OUTPATIENT)
Dept: FAMILY MEDICINE CLINIC | Facility: CLINIC | Age: 19
End: 2024-09-09

## 2024-09-09 NOTE — TELEPHONE ENCOUNTER
Pt made mychart appt with following message       Appointment For: Mihai Villalpando ([id number])   Visit Type: MYCHART FOLLOW UP (3209)      9/10/2024    9:30 AM  15 mins.  John Gibbs         [facility] [address]      Patient Comments:   Increased shortness breath and fatigue, upper left abdomen pain and possible growth

## 2024-09-10 ENCOUNTER — OFFICE VISIT (OUTPATIENT)
Dept: FAMILY MEDICINE CLINIC | Facility: CLINIC | Age: 19
End: 2024-09-10
Payer: COMMERCIAL

## 2024-09-10 VITALS
DIASTOLIC BLOOD PRESSURE: 62 MMHG | RESPIRATION RATE: 16 BRPM | HEART RATE: 70 BPM | HEIGHT: 68 IN | BODY MASS INDEX: 18.79 KG/M2 | TEMPERATURE: 98 F | SYSTOLIC BLOOD PRESSURE: 108 MMHG | WEIGHT: 124 LBS

## 2024-09-10 DIAGNOSIS — R10.84 GENERALIZED ABDOMINAL PAIN: ICD-10-CM

## 2024-09-10 DIAGNOSIS — R06.02 SHORTNESS OF BREATH: Primary | ICD-10-CM

## 2024-09-10 PROCEDURE — 99214 OFFICE O/P EST MOD 30 MIN: CPT | Performed by: FAMILY MEDICINE

## 2024-09-10 NOTE — PROGRESS NOTES
[facility] Family Medicine Office Note  Chief Complaint:   Chief Complaint   Patient presents with    Shortness Of Breath     Pt c/o on and off SOB    Abdominal Pain     Right side abdominal discomfort.       HPI:   This is a 18 year old male coming in for continued episodes of shortness of breath.  The patient was previously given a rescue inhaler albuterol.  He states that he did not feel any improvement with using the medication although he had been using it infrequently.  He states that this shortness of breath happens with and without exertion.  He is also complaining of some left lower quadrant abdominal pain.  He states that he has had no issues with his bowel movements he had a bowel movement this morning.  States he last had this pain 2 days ago and is unable to related to any other causes.      No past medical history on file.  No past surgical history on file.  Social History:  Social History     Socioeconomic History    Marital status: Single   Tobacco Use    Smoking status: Never    Smokeless tobacco: Never     Family History:  Family History   Problem Relation Age of Onset    Heart Disease Maternal Grandfather     Cancer Maternal Grandfather      Allergies:  Allergies   Allergen Reactions    Penicillins OTHER (SEE COMMENTS)     Current Meds:  Current Outpatient Medications   Medication Sig Dispense Refill    metoprolol tartrate 25 MG Oral Tab Take 0.5 tablets (12.5 mg total) by mouth 2 (two) times daily.      pantoprazole 40 MG Oral Tab EC Take 1 tablet (40 mg total) by mouth every morning before breakfast. (Patient not taking: Reported on 9/10/2024) 90 tablet 0    albuterol 108 (90 Base) MCG/ACT Inhalation Aero Soln Inhale 2 puffs into the lungs every 4 (four) hours as needed for Wheezing. (Patient not taking: Reported on 9/10/2024) 1 each 0      Counseling given: Not Answered       REVIEW OF SYSTEMS:   Consitutional: No fevers, chills, sweats  Eye: No recent visual problems  ENMT: No ear  pain nasal congestion sore throat  Respiratory: Positive shortness of breath, no cough  Cardiovascular: No chest pain palpitations syncope  Gastrointestinal: No nausea vomiting diarrhea positive abdominal pain  Genitourinary: No hematuria  Hema/Lymph no bruising tendency, swollen lymph glands  Endocrine: Negative for excessive thirst excessive hunger      Medical, surgical, family, and social histories were reviewed      EXAM:   VITALS:   Vitals:    09/10/24 0927   BP: 108/62   Pulse: 70   Resp: 16   Temp: 97.6 °F (36.4 °C)      GENERAL: well developed, well nourished, in no apparent distress  SKIN: no rashes, no suspicious lesions: Cool and Dry  HEENT: atraumatic, normocephalic, ears and throat are clear    Ears: TM's clear and visible bilaterally, no excess cerumen or erythema.   EYES: Pupils equal round and reactive.  Extraocular motions intact no scleral icterus no injection or drainage  THROAT without erythema tonsillar hypertrophy or exudate.  Uvula midline airway patent  NECK: Given midline.  No JVD or lymphadenopathy supple nontender no meningeal signs   LUNGS: clear to auscultation sounds equal bilaterally no wheezes rales or rhonchi  CARDIO: Regular rate and rhythm without murmurs gallops or rubs  GI: Soft nontender nondistended no hepatomegaly palpable masses.  No guarding.      ASSESSMENT AND PLAN:   1. Shortness of breath  - Pulmonary Referral - In Network  I did discuss with the patient would like for him to try to use the albuterol when he does have these episodes of shortness of breath.  I did discuss I would like for him to follow-up with pulmonology for further recommendations.  2. Generalized abdominal pain  - US ABDOMEN COMPLETE (CPT=76700); Future  I did discuss with the patient there were no acute findings on physical exam but we will be ordering an ultrasound of the abdomen to discern if there is any pathological findings for this abdominal discomfort that he is complaining of.    Meds &  Refills for this Visit:  Requested Prescriptions      No prescriptions requested or ordered in this encounter       Health Maintenance:  Health Maintenance Due   Topic Date Due    Annual Physical  Never done    COVID-19 Vaccine (1 - 2023-24 season) Never done       Patient/Caregiver Education: Patient/Caregiver Education: There are no barriers to learning. Medical education done.   Outcome: Patient verbalizes understanding. Patient is notified to call with any questions, complications, allergies, or worsening or changing symptoms.  Patient is to call with any side effects or complications from the treatments as a result of today.     Problem List:  Patient Active Problem List   Diagnosis    Acne vulgaris    SOB (shortness of breath)    Lightheadedness    Heart palpitations    Chest pain, unspecified         No follow-ups on file.     John Gibbs MD    Please note that portions of this note may have been completed with a voice recognition program. Efforts were made to edit the dictations but occasionally words are mis-transcribed.

## 2024-09-16 ENCOUNTER — HOSPITAL ENCOUNTER (OUTPATIENT)
Dept: ULTRASOUND IMAGING | Facility: HOSPITAL | Age: 19
Discharge: HOME | End: 2024-09-16
Attending: FAMILY MEDICINE
Payer: COMMERCIAL

## 2024-09-16 DIAGNOSIS — R10.84 GENERALIZED ABDOMINAL PAIN: ICD-10-CM

## 2024-09-16 PROCEDURE — 76700 US EXAM ABDOM COMPLETE: CPT | Performed by: FAMILY MEDICINE

## 2024-10-02 ENCOUNTER — APPOINTMENT (OUTPATIENT)
Dept: CT IMAGING | Facility: HOSPITAL | Age: 19
End: 2024-10-02
Attending: EMERGENCY MEDICINE
Payer: COMMERCIAL

## 2024-10-02 ENCOUNTER — HOSPITAL ENCOUNTER (EMERGENCY)
Facility: HOSPITAL | Age: 19
Discharge: HOME | End: 2024-10-02
Attending: EMERGENCY MEDICINE
Payer: COMMERCIAL

## 2024-10-02 VITALS
DIASTOLIC BLOOD PRESSURE: 82 MMHG | TEMPERATURE: 99 F | RESPIRATION RATE: 12 BRPM | BODY MASS INDEX: 19.62 KG/M2 | SYSTOLIC BLOOD PRESSURE: 129 MMHG | WEIGHT: 125 LBS | HEIGHT: 67 IN | HEART RATE: 75 BPM | OXYGEN SATURATION: 100 %

## 2024-10-02 DIAGNOSIS — K59.00 CONSTIPATION, UNSPECIFIED CONSTIPATION TYPE: Primary | ICD-10-CM

## 2024-10-02 LAB
ALBUMIN SERPL-MCNC: 5.2 G/DL (ref 3.2–4.8)
ALBUMIN/GLOB SERPL: 1.9 {RATIO} (ref 1–2)
ALP LIVER SERPL-CCNC: 67 U/L
ALT SERPL-CCNC: 17 U/L
ANION GAP SERPL CALC-SCNC: 12 MMOL/L (ref 0–18)
AST SERPL-CCNC: 18 U/L (ref ?–34)
BASOPHILS # BLD AUTO: 0.07 X10(3) UL (ref 0–0.2)
BASOPHILS NFR BLD AUTO: 0.6 %
BILIRUB SERPL-MCNC: 0.8 MG/DL (ref 0.3–1.2)
BILIRUB UR QL STRIP.AUTO: NEGATIVE
BUN BLD-MCNC: 7 MG/DL (ref 9–23)
CALCIUM BLD-MCNC: 10.5 MG/DL (ref 8.7–10.4)
CHLORIDE SERPL-SCNC: 105 MMOL/L (ref 98–112)
CLARITY UR REFRACT.AUTO: CLEAR
CO2 SERPL-SCNC: 24 MMOL/L (ref 21–32)
COLOR UR AUTO: COLORLESS
CREAT BLD-MCNC: 0.9 MG/DL
EGFRCR SERPLBLD CKD-EPI 2021: 125 ML/MIN/1.73M2 (ref 60–?)
EOSINOPHIL # BLD AUTO: 0.59 X10(3) UL (ref 0–0.7)
EOSINOPHIL NFR BLD AUTO: 5.4 %
ERYTHROCYTE [DISTWIDTH] IN BLOOD BY AUTOMATED COUNT: 11.9 %
GLOBULIN PLAS-MCNC: 2.7 G/DL (ref 2–3.5)
GLUCOSE BLD-MCNC: 102 MG/DL (ref 70–99)
GLUCOSE UR STRIP.AUTO-MCNC: NORMAL MG/DL
HCT VFR BLD AUTO: 42.6 %
HGB BLD-MCNC: 15.4 G/DL
IMM GRANULOCYTES # BLD AUTO: 0.03 X10(3) UL (ref 0–1)
IMM GRANULOCYTES NFR BLD: 0.3 %
KETONES UR STRIP.AUTO-MCNC: NEGATIVE MG/DL
LEUKOCYTE ESTERASE UR QL STRIP.AUTO: NEGATIVE
LIPASE SERPL-CCNC: 26 U/L (ref 12–53)
LYMPHOCYTES # BLD AUTO: 2.65 X10(3) UL (ref 1–4)
LYMPHOCYTES NFR BLD AUTO: 24.4 %
MCH RBC QN AUTO: 30.2 PG (ref 26–34)
MCHC RBC AUTO-ENTMCNC: 36.2 G/DL (ref 31–37)
MCV RBC AUTO: 83.5 FL
MONOCYTES # BLD AUTO: 1.35 X10(3) UL (ref 0.1–1)
MONOCYTES NFR BLD AUTO: 12.4 %
NEUTROPHILS # BLD AUTO: 6.17 X10 (3) UL (ref 1.5–7.7)
NEUTROPHILS # BLD AUTO: 6.17 X10(3) UL (ref 1.5–7.7)
NEUTROPHILS NFR BLD AUTO: 56.9 %
NITRITE UR QL STRIP.AUTO: NEGATIVE
OSMOLALITY SERPL CALC.SUM OF ELEC: 290 MOSM/KG (ref 275–295)
PH UR STRIP.AUTO: 7.5 [PH] (ref 5–8)
PLATELET # BLD AUTO: 258 10(3)UL (ref 150–450)
POTASSIUM SERPL-SCNC: 3.5 MMOL/L (ref 3.5–5.1)
PROT SERPL-MCNC: 7.9 G/DL (ref 5.7–8.2)
PROT UR STRIP.AUTO-MCNC: NEGATIVE MG/DL
RBC # BLD AUTO: 5.1 X10(6)UL
RBC UR QL AUTO: NEGATIVE
SODIUM SERPL-SCNC: 141 MMOL/L (ref 136–145)
SP GR UR STRIP.AUTO: 1.01 (ref 1–1.03)
UROBILINOGEN UR STRIP.AUTO-MCNC: NORMAL MG/DL
WBC # BLD AUTO: 10.9 X10(3) UL (ref 4–11)

## 2024-10-02 PROCEDURE — 99284 EMERGENCY DEPT VISIT MOD MDM: CPT

## 2024-10-02 PROCEDURE — 85025 COMPLETE CBC W/AUTO DIFF WBC: CPT | Performed by: EMERGENCY MEDICINE

## 2024-10-02 PROCEDURE — 74177 CT ABD & PELVIS W/CONTRAST: CPT | Performed by: EMERGENCY MEDICINE

## 2024-10-02 PROCEDURE — 99285 EMERGENCY DEPT VISIT HI MDM: CPT

## 2024-10-02 PROCEDURE — 36415 COLL VENOUS BLD VENIPUNCTURE: CPT

## 2024-10-02 PROCEDURE — 80053 COMPREHEN METABOLIC PANEL: CPT | Performed by: EMERGENCY MEDICINE

## 2024-10-02 PROCEDURE — 83690 ASSAY OF LIPASE: CPT | Performed by: EMERGENCY MEDICINE

## 2024-10-02 PROCEDURE — 81003 URINALYSIS AUTO W/O SCOPE: CPT | Performed by: EMERGENCY MEDICINE

## 2024-10-02 NOTE — ED PROVIDER NOTES
Patient Seen in: [facility] Emergency Department      History     Chief Complaint   Patient presents with    Abdomen/Flank Pain    Fatigue    Difficulty Breathing     Stated Complaint: Pt c/o SOB for the past 6 months and symptoms are worse today. Pt feels more fa*    Subjective:   HPI    19-year-old male comes to the hospital complaint of having difficulty with some discomfort in the area of his left upper quadrant this been there intermittently over the last 3 months.  Says it can be sharp and only lasts for a few moments at a time.  Is nothing specific that makes it better or worse.  He denies any headaches or dizziness.  No fevers or chills.  Denies any nausea vomiting or diarrhea.  He denies any dysuria.  There is no surgical history.  No other complaints at this time.    Objective:     Past Medical History:    Abdominal distention    Abdominal pain    Chest pain    Chest pain on exertion    Enlarged lymph node    Fatigue    Flatulence/gas pain/belching    Heart palpitations    Heartburn    Indigestion    Nausea    Problems with swallowing    Shortness of breath    Sleep disturbance    Uncomfortable fullness after meals    Weight loss    Wheezing              History reviewed. No pertinent surgical history.             Social History     Socioeconomic History    Marital status: Single   Tobacco Use    Smoking status: Never     Passive exposure: Never    Smokeless tobacco: Never   Substance and Sexual Activity    Alcohol use: Yes     Comment: once per month    Drug use: Never              Physical Exam     ED Triage Vitals [10/02/24 0537]   /74   Pulse 92   Resp 18   Temp 99.4 °F (37.4 °C)   Temp src    SpO2 100 %   O2 Device None (Room air)       Current Vitals:   Vital Signs  BP: 129/82  Pulse: 75  Resp: 12  Temp: 99.4 °F (37.4 °C)  MAP (mmHg): 97    Oxygen Therapy  SpO2: 100 %  O2 Device: None (Room air)        Physical Exam  HEENT : NCAT, EOMI, PEERL,  neck supple, no JVD, trachea midline, No  LAD  Heart: S1S2 normal. No murmurs, regular rate and rhythm  Lungs: Clear to auscultation bilaterally  Abdomen: Soft left upper quadrant region is tender nondistended normal active bowel sounds without rebound, guarding or masses noted  Back nontender without CVA tenderness  Extremity no clubbing, cyanosis or edema noted.  Full range of motion noted without tenderness  Neuro: No focal deficits noted    All measures to prevent infection transmission during my interaction with the patient were taken.  The patient was already wearing droplet mask on my arrival to the room.  Personal protective equipment including a droplet mask as well as gloves were worn throughout the duration of my exam.  Hand washing was performed prior to and after the exam.  Stethoscope and equipment used during my examination was cleaned with a super Sani cloth germicidal wipe following the exam.    ED Course     Labs Reviewed   COMP METABOLIC PANEL (14) - Abnormal; Notable for the following components:       Result Value    Glucose 102 (*)     BUN 7 (*)     Calcium, Total 10.5 (*)     Albumin 5.2 (*)     All other components within normal limits   CBC WITH DIFFERENTIAL WITH PLATELET - Abnormal; Notable for the following components:    Monocyte Absolute 1.35 (*)     All other components within normal limits   URINALYSIS, ROUTINE - Abnormal; Notable for the following components:    Urine Color Colorless (*)     All other components within normal limits   LIPASE - Normal   RAINBOW DRAW LAVENDER   RAINBOW DRAW LIGHT GREEN   RAINBOW DRAW BLUE   RAINBOW DRAW GOLD       ED Course as of 10/02/24 0836  ------------------------------------------------------------  Time: 10/02 0833  Comment: While here the urinalysis was done by the patient that was normal.  His lipase was 26.  His CMP was unremarkable.  The patient CBC had a white count of 10.9 thousand.  The patient had a CT of the abdomen pelvis consistent with increased stool by my interpretation.  I  read the radiologist report as well.       CT ABDOMEN+PELVIS(CONTRAST ONLY)(CPT=74177)    Result Date: 10/2/2024  PROCEDURE:  CT ABDOMEN+PELVIS (CONTRAST ONLY) (CPT=74177)  COMPARISON:  [facility] , CT, CT CARDIAC OVER READ, 7/02/2024, 8:17 AM.  INDICATIONS:  Pt c/o SOB for the past 6 months and symptoms are worse today. Pt feels more fatigue. Pt also c/o abd pain on the LLQ.  TECHNIQUE:  CT scanning was performed from the dome of the diaphragm to the pubic symphysis with non-ionic intravenous contrast material. Post contrast coronal MPR imaging was performed.  Dose reduction techniques were used. Dose information is transmitted to the ACR (American College of Radiology) NRDR (National Radiology Data Registry) which includes the Dose Index Registry.  PATIENT STATED HISTORY:(As transcribed by Technologist)  Left lower quadrant pain for 1 month. fatique and nausea   CONTRAST USED:  65cc of Isovue 370  FINDINGS:  LIVER:  No enlargement, atrophy, abnormal density, or significant focal lesion.  BILIARY:  No visible dilatation or calcification.  PANCREAS:  No lesion, fluid collection, ductal dilatation, or atrophy.  SPLEEN:  No enlargement or focal lesion.  KIDNEYS:  No mass, obstruction, or calcification.  Mild prominence to the pelvocaliceal systems due to the markedly distended bladder.  No nephrolithiasis or suspicious mass.  Normal enhancement.  Slight prominence to the mid right ureter.  No ureteral calculus noted ADRENALS:  No mass or enlargement.  AORTA/VASCULAR:  No aneurysm or dissection.  RETROPERITONEUM:  No mass or adenopathy.  BOWEL/MESENTERY:  No visible mass, obstruction, or bowel wall thickening.  No free air, no free fluid.  Moderate stool burden.  Normal appendix. ABDOMINAL WALL:  No mass or hernia.  URINARY BLADDER:  Moderate distention of the bladder without mass, calcification or wall thickening. PELVIC NODES:  No adenopathy.  PELVIC ORGANS:  No visible mass.  Pelvic organs appropriate for patient age.   BONES:  No bony lesion or fracture.  LUNG BASES:  No visible pulmonary or pleural disease.  OTHER:  Negative.             CONCLUSION:  1. Moderately distended bladder with slight prominence to the pelvocaliceal cisterns and portions of the right mid ureter most likely related to the distended bladder.  Normal renal enhancement.  No nephrolithiasis.  No bladder mass. 2. Moderate stool burden without obstruction.  The appendix is normal.    LOCATION:  [place] [id number]   Dictated by (CST): Bernard Edwards MD on 10/02/2024 at 7:48 AM     Finalized by (CST): Bernard Edwards MD on 10/02/2024 at 7:55 AM       US ABDOMEN COMPLETE (CPT=76700)    Result Date: 9/16/2024  PROCEDURE: US ABDOMEN COMPLETE (CPT=76700)  COMPARISON: None available.  INDICATIONS: Generalized abdominal pain occurring intermittently.  TECHNIQUE:   The abdomen was evaluated with grayscale and colorflow of the main vessels.   FINDINGS:  LIVER:   Normal in size. There is mildly increased parenchymal echogenicity and heterogeneous, coarse echotexture, compatible with hepatic steatosis. These features impair penetration of the acoustic beam and reduce sensitivity for detection of hepatic lesions. Within these parameters, no focal masses are identified. There is no intrahepatic biliary ductal dilatation.  GALLBLADDER/CBD: No echogenic, shadowing calculi are visible. There is no significant biliary sludge. No gallbladder wall thickening, pericholecystic fluid, or intraluminal dilatation is evident. The common bile duct is normal in caliber, measuring 0.4 cm. PANCREAS: Visualized portions of the pancreatic head and body have a grossly unremarkable sonographic appearance. Views of the tail are obscured by intervening bowel gas. SPLEEN: Homogenous echotexture without enlargement. The spleen measures 10.6 cm. Small accessory splenules are noted. KIDNEYS: The right kidney measures 10.4 cm. The left kidney measures 10.2 cm. There is no evidence of hydronephrosis. No  echogenic, shadowing calculus is visualized. AORTA/VASCULAR: Visualized portions of the aorta and IVC are patent. The aorta is normal in caliber, measuring 1.8 cm proximally and 1.5 cm distally, without evidence of aneurysmal dilatation on survey imaging.   Patency and normal hepatopetal flow directionality of the main portal vein is demonstrated with velocity recorded as 29 cm/s. OTHER: Negative. No ascites or adenopathy seen.          CONCLUSION:  1. No acute intra-abdominal process is identified by sonographic technique. The etiology of the patient's symptoms is unclear from this study.  2. Negative for hepatobiliary dilatation.  3. Sonographic features suggesting mild hepatic steatosis.  4. Lesser incidental findings as above.   elm-remote.   Dictated by (CST): Marko Mccain MD on 9/16/2024 at 11:38 PM     Finalized by (CST): Marko Mccain MD on 9/16/2024 at 11:39 PM           Medications   iopamidol 76% (ISOVUE-370) injection for power injector (65 mL Intravenous Given 10/2/24 0654)            MDM      Differential diagnosis included UTI, constipation, diverticulitis, appendicitis but limited these.  Patient's workup here shows no surgical pathology.  He is constipated at this time we discharged home with his strategy for resolution and follow-up.    Patient was screened and evaluated during this visit.   As a treating physician attending to the patient, I determined, within reasonable clinical confidence and prior to discharge, that an emergency medical condition was not or was no longer present.  There was no indication for further evaluation, treatment or admission on an emergency basis.       The usual and customary discharge instuctions were discussed given the patient's ER course.  We discussed signs and symptoms that should prompt the patient's immediate return to the emergency department.   Reasonable over the counter and prescription treatment options and Physician follow up plan was discussed.        The patient is discharged in good condition.     This note was prepared using Dragon Medical voice recognition dictation software.  As a result errors may occur.  When identified to these areas have been corrected.  While every attempt is made to correct errors during dictation discrepancies may still exist.  Please contact if there are any errors.      Medical Decision Making      Disposition and Plan     Clinical Impression:  1. Constipation, unspecified constipation type         Disposition:  Discharge  10/2/2024  8:35 am    Follow-up:  John Gibbs MD  [address] [place] [id number] [phone]    Schedule an appointment as soon as possible for a visit in 2 day(s)            Medications Prescribed:  Current Discharge Medication List              Supplementary Documentation:

## 2024-10-09 ENCOUNTER — OFFICE VISIT (OUTPATIENT)
Age: 19
End: 2024-10-09
Payer: COMMERCIAL

## 2024-10-09 VITALS
DIASTOLIC BLOOD PRESSURE: 74 MMHG | HEIGHT: 67 IN | RESPIRATION RATE: 16 BRPM | SYSTOLIC BLOOD PRESSURE: 126 MMHG | WEIGHT: 125 LBS | HEART RATE: 69 BPM | BODY MASS INDEX: 19.62 KG/M2 | OXYGEN SATURATION: 98 %

## 2024-10-09 DIAGNOSIS — J45.20 MILD INTERMITTENT ASTHMA WITHOUT COMPLICATION (HCC): Primary | ICD-10-CM

## 2024-10-09 PROCEDURE — 99204 OFFICE O/P NEW MOD 45 MIN: CPT | Performed by: INTERNAL MEDICINE

## 2024-10-09 RX ORDER — ALBUTEROL SULFATE AND BUDESONIDE 90; 80 UG/1; UG/1
2 AEROSOL, METERED RESPIRATORY (INHALATION) EVERY 6 HOURS PRN
Qty: 10.7 G | Refills: 10 | Status: SHIPPED | OUTPATIENT
Start: 2024-10-09

## 2024-10-09 NOTE — PROGRESS NOTES
[facility] General Pulmonary Consult Note    Chief Complaint:  Chief Complaint   Patient presents with    New Patient     Pt c/o dyspnea on exertion and rest at times x6 months, GI clearance        History of Present Illness:  Mihai Villalpando is a 19 year old male who presents today for evaluation of shortness of breath.  This has been ongoing for several months started this past April/may.  They carry no formal diagnosis of asthma.  No cough, some occasional phlegm. Occasional wheezing/chest tightness. Patient is a never smoker or vaping.  No 2nd hand smoke exposure.  Goes to [employer] in [place].  No pets.  .      Past Medical History:   Past Medical History:    Abdominal distention    Abdominal pain    Chest pain    Chest pain on exertion    Enlarged lymph node    Fatigue    Flatulence/gas pain/belching    Heart palpitations    Heartburn    Indigestion    Nausea    Problems with swallowing    Shortness of breath    Sleep disturbance    Uncomfortable fullness after meals    Weight loss    Wheezing        Past Surgical History: History reviewed. No pertinent surgical history.    Family Medical History:   Family History   Problem Relation Age of Onset    Stroke Maternal Grandfather     Heart Attack Maternal Grandfather     Prostate Cancer Maternal Grandfather     Heart Disease Maternal Grandfather     Cancer Maternal Grandfather         Social History:   Social History     Socioeconomic History    Marital status: Single     Spouse name: Not on file    Number of children: Not on file    Years of education: Not on file    Highest education level: Not on file   Occupational History    Not on file   Tobacco Use    Smoking status: Never     Passive exposure: Never    Smokeless tobacco: Never   Substance and Sexual Activity    Alcohol use: Yes     Comment: once per month    Drug use: Never    Sexual activity: Not on file   Other Topics Concern    Not on file   Social History Narrative    Not on file     Social Drivers  of Health     Financial Resource Strain: Not on file   Food Insecurity: Not on file   Transportation Needs: Not on file   Physical Activity: Not on file   Stress: Not on file   Social Connections: Not on file   Housing Stability: Not on file        Allergies: Penicillins     Medications:   Current Outpatient Medications   Medication Sig Dispense Refill    polyethylene glycol, PEG 3350-KCl-NaBcb-NaCl-NaSulf, 236 g Oral Recon Soln Take as directed by physician 4000 mL 0    Esomeprazole Magnesium (NEXIUM) 40 MG Oral Capsule Delayed Release Take 1 capsule (40 mg total) by mouth daily. 90 capsule 0    metoprolol tartrate 25 MG Oral Tab Take 0.5 tablets (12.5 mg total) by mouth 2 (two) times daily.      albuterol 108 (90 Base) MCG/ACT Inhalation Aero Soln Inhale 2 puffs into the lungs every 4 (four) hours as needed for Wheezing. 1 each 0       Review of Systems: Review of Systems    Physical Exam:  /74 (BP Location: Right arm, Patient Position: Sitting, Cuff Size: adult)   Pulse 69   Resp 16   Ht 5' 7\" (1.702 m)   Wt 125 lb (56.7 kg)   SpO2 98%   BMI 19.58 kg/m²      Constitutional: alert, cooperative. No acute distress.  HEENT: Head NC/AT. Nasal passages slightly red/erythamatous scattered mucus. Mallampati 1+  Cardio: Regular rate and rhythm. Normal S1 and S2. No murmurs.   Respiratory: Thorax symmetrical with no labored breathing. clear to auscultation bilaterally  GI: NABS. Abd soft, non-tender.  Extremities: No clubbing or cyanosis. No BLE edema.    Neurologic: A&Ox3. No gross motor deficits.  Skin: Warm, dry  Psych: Calm, cooperative. Pleasant affect.    Results:  Personally reviewed  WBC: 10.9, done on 10/2/2024.  HGB: 15.4, done on 10/2/2024.  PLT: 258, done on 10/2/2024.     Glucose: 102, done on 10/2/2024.  Cr: 0.9, done on 10/2/2024.  Last eGFR was 125 on 10/2/2024.  CA: 10.5, done on 10/2/2024.  Na: 141, done on 10/2/2024.  K: 3.5, done on 10/2/2024.  Cl: 105, done on 10/2/2024.  CO2: 24, done on  10/2/2024.  Last ALB was 5.2% done on 10/2/2024.     CT ABDOMEN+PELVIS(CONTRAST ONLY)(CPT=74177)    Result Date: 10/2/2024  CONCLUSION:  1. Moderately distended bladder with slight prominence to the pelvocaliceal cisterns and portions of the right mid ureter most likely related to the distended bladder.  Normal renal enhancement.  No nephrolithiasis.  No bladder mass. 2. Moderate stool burden without obstruction.  The appendix is normal.    LOCATION:  [id number]   Dictated by (CST): Bernard Edwards MD on 10/02/2024 at 7:48 AM     Finalized by (CST): Bernard Edwards MD on 10/02/2024 at 7:55 AM       US ABDOMEN COMPLETE (CPT=76700)    Result Date: 9/16/2024  CONCLUSION:  1. No acute intra-abdominal process is identified by sonographic technique. The etiology of the patient's symptoms is unclear from this study.  2. Negative for hepatobiliary dilatation.  3. Sonographic features suggesting mild hepatic steatosis.  4. Lesser incidental findings as above.   elm-remote.   Dictated by (CST): Marko Mccain MD on 9/16/2024 at 11:38 PM     Finalized by (CST): Marko Mccain MD on 9/16/2024 at 11:39 PM            Assessment/Plan:  #1. Possible mild intermittent asthma  Trial of airsupra  Check PFTs  Check asthma allergen panel  The pathology and natural history of asthma was explained to the patient. We reviewed the role of pharmacotherapy, the need for exercise and avoidance of smoke and other environmental pollutants and allergens. We've discussed the importance of compliance with the various treatment modalities such as beta agonists and inhaled steroids to achieve adequate asthma control which was defined for the patient as requiring use of the rescue inhaler less than 2x/week, not more than 2 nocturnal awakenings due to asthma in a month, and few or no limitations in exercise or other physical activities due to asthma. The concepts of prophylactic and episodic therapy have been discussed. The patient indicates  understanding of these issues.       #2. Possible allergic rhinitis  Trial of flonase as needed      No follow-ups on file.    Yashira Baez MD  10/9/2024

## 2024-10-15 ENCOUNTER — TELEPHONE (OUTPATIENT)
Facility: CLINIC | Age: 19
End: 2024-10-15

## 2024-10-15 DIAGNOSIS — J45.20 MILD INTERMITTENT ASTHMA WITHOUT COMPLICATION (HCC): Primary | ICD-10-CM

## 2024-10-15 NOTE — TELEPHONE ENCOUNTER
Dr. Baez notified regarding orders mentioned on his office visit note from 10/9/2024. Per physician order food allergen profile and cbc with diff, complete pft and allergens zone 8. Orders entered and patient called to notify. Central scheduling number provided. Patient advised to call our office when completing blood work and pulmonary function test in order for follow up appointment to be scheduled for physician to review results. Patient verbalized understanding.

## 2024-10-23 ENCOUNTER — RT VISIT (OUTPATIENT)
Dept: RESPIRATORY THERAPY | Facility: HOSPITAL | Age: 19
End: 2024-10-23
Attending: INTERNAL MEDICINE
Payer: COMMERCIAL

## 2024-10-23 ENCOUNTER — LAB ENCOUNTER (OUTPATIENT)
Dept: LAB | Facility: HOSPITAL | Age: 19
End: 2024-10-23
Attending: INTERNAL MEDICINE
Payer: COMMERCIAL

## 2024-10-23 DIAGNOSIS — J45.20 MILD INTERMITTENT ASTHMA WITHOUT COMPLICATION (HCC): ICD-10-CM

## 2024-10-23 LAB
BASOPHILS # BLD AUTO: 0.06 X10(3) UL (ref 0–0.2)
BASOPHILS NFR BLD AUTO: 0.9 %
EOSINOPHIL # BLD AUTO: 0.4 X10(3) UL (ref 0–0.7)
EOSINOPHIL NFR BLD AUTO: 5.8 %
ERYTHROCYTE [DISTWIDTH] IN BLOOD BY AUTOMATED COUNT: 12.4 %
HCT VFR BLD AUTO: 46.3 %
HGB BLD-MCNC: 15.9 G/DL
IMM GRANULOCYTES # BLD AUTO: 0.01 X10(3) UL (ref 0–1)
IMM GRANULOCYTES NFR BLD: 0.1 %
LYMPHOCYTES # BLD AUTO: 2.63 X10(3) UL (ref 1–4)
LYMPHOCYTES NFR BLD AUTO: 38.2 %
MCH RBC QN AUTO: 29.7 PG (ref 26–34)
MCHC RBC AUTO-ENTMCNC: 34.3 G/DL (ref 31–37)
MCV RBC AUTO: 86.4 FL
MONOCYTES # BLD AUTO: 0.56 X10(3) UL (ref 0.1–1)
MONOCYTES NFR BLD AUTO: 8.1 %
NEUTROPHILS # BLD AUTO: 3.22 X10 (3) UL (ref 1.5–7.7)
NEUTROPHILS # BLD AUTO: 3.22 X10(3) UL (ref 1.5–7.7)
NEUTROPHILS NFR BLD AUTO: 46.9 %
PLATELET # BLD AUTO: 328 10(3)UL (ref 150–450)
RBC # BLD AUTO: 5.36 X10(6)UL
WBC # BLD AUTO: 6.9 X10(3) UL (ref 4–11)

## 2024-10-23 PROCEDURE — 82785 ASSAY OF IGE: CPT

## 2024-10-23 PROCEDURE — 86003 ALLG SPEC IGE CRUDE XTRC EA: CPT

## 2024-10-23 PROCEDURE — 85025 COMPLETE CBC W/AUTO DIFF WBC: CPT

## 2024-10-23 PROCEDURE — 94729 DIFFUSING CAPACITY: CPT | Performed by: INTERNAL MEDICINE

## 2024-10-23 PROCEDURE — 94726 PLETHYSMOGRAPHY LUNG VOLUMES: CPT | Performed by: INTERNAL MEDICINE

## 2024-10-23 PROCEDURE — 94010 BREATHING CAPACITY TEST: CPT | Performed by: INTERNAL MEDICINE

## 2024-10-23 PROCEDURE — 36415 COLL VENOUS BLD VENIPUNCTURE: CPT

## 2024-10-24 LAB
A ALTERNATA IGE QN: 13.8 KUA/L (ref ?–0.1)
A FUMIGATUS IGE QN: 2.81 KUA/L (ref ?–0.1)
AMER SYCAMORE IGE QN: 0.19 KUA/L (ref ?–0.1)
BERMUDA GRASS IGE QN: 0.49 KUA/L (ref ?–0.1)
BOXELDER IGE QN: 0.7 KUA/L (ref ?–0.1)
C HERBARUM IGE QN: 3.66 KUA/L (ref ?–0.1)
CALIF WALNUT IGE QN: 0.67 KUA/L (ref ?–0.1)
CAT DANDER IGE QN: 0.54 KUA/L (ref ?–0.1)
CMN PIGWEED IGE QN: 0.13 KUA/L (ref ?–0.1)
COMMON RAGWEED IGE QN: 0.54 KUA/L (ref ?–0.1)
COTTONWOOD IGE QN: 1.92 KUA/L (ref ?–0.1)
D FARINAE IGE QN: 2.37 KUA/L (ref ?–0.1)
D PTERONYSS IGE QN: 0.47 KUA/L (ref ?–0.1)
DOG DANDER IGE QN: 0.25 KUA/L (ref ?–0.1)
IGE SERPL-ACNC: 397 KU/L (ref 2–214)
M RACEMOSUS IGE QN: 0.83 KUA/L (ref ?–0.1)
MARSH ELDER IGE QN: 0.5 KUA/L (ref ?–0.1)
MOUSE EPITH IGE QN: <0.1 KUA/L (ref ?–0.1)
MT JUNIPER IGE QN: 0.27 KUA/L (ref ?–0.1)
P NOTATUM IGE QN: 0.47 KUA/L (ref ?–0.1)
PECAN/HICK TREE IGE QN: 0.65 KUA/L (ref ?–0.1)
ROACH IGE QN: <0.1 KUA/L (ref ?–0.1)
SALTWORT IGE QN: 0.32 KUA/L (ref ?–0.1)
SILVER BIRCH IGE QN: 1.55 KUA/L (ref ?–0.1)
TIMOTHY IGE QN: 5.65 KUA/L (ref ?–0.1)
WHITE ASH IGE QN: 3.24 KUA/L (ref ?–0.1)
WHITE ELM IGE QN: 0.77 KUA/L (ref ?–0.1)
WHITE MULBERRY IGE QN: <0.1 KUA/L (ref ?–0.1)
WHITE OAK IGE QN: 2.31 KUA/L (ref ?–0.1)

## 2024-10-25 LAB
ALLERGEN BRAZIL NUT: <0.1 KUA/L (ref ?–0.1)
ALMOND IGE QN: <0.1 KUA/L (ref ?–0.1)
CASHEW NUT IGE QN: <0.1 KUA/L (ref ?–0.1)
CLAM IGE QN: <0.1 KUA/L (ref ?–0.1)
CODFISH IGE QN: <0.1 KUA/L (ref ?–0.1)
CORN IGE QN: 0.13 KUA/L (ref ?–0.1)
COW MILK IGE QN: 0.14 KUA/L (ref ?–0.1)
EGG WHITE IGE QN: 0.13 KUA/L (ref ?–0.1)
GLUTEN IGE QN: <0.1 KUA/L (ref ?–0.1)
HAZELNUT IGE QN: 0.5 KUA/L (ref ?–0.1)
IGE SERPL-ACNC: 411 KU/L (ref 2–214)
PEANUT IGE QN: 0.31 KUA/L (ref ?–0.1)
SALMON IGE QN: <0.1 KUA/L (ref ?–0.1)
SCALLOP IGE QN: <0.1 KUA/L (ref ?–0.1)
SESAME SEED IGE QN: 0.23 KUA/L (ref ?–0.1)
SHRIMP IGE QN: <0.1 KUA/L (ref ?–0.1)
SOYBEAN IGE QN: <0.1 KUA/L (ref ?–0.1)
WALNUT IGE QN: <0.1 KUA/L (ref ?–0.1)
WHEAT IGE QN: 0.21 KUA/L (ref ?–0.1)

## 2024-11-13 NOTE — PROCEDURES
Pulmonary Function Test Report  Findings:  Prebronchodilator FEV1 is 4.60L, z-score 1.38.  Prebronchodilator FVC is 5.04L, z-score 0.91.                           FEV1/ FVC ratio is 91 %, z-score 0.80.   The flow-volume loop demonstrates a normal pattern.  The TLC is 7.58L, z-score 1.84. The residual volume 2.42L, z-score 1.83.   The diffusion capacity is 33.60, z-score 0.80 and -0.93 when corrected for alveolar volume.     Impression:  Spirometry is normal.  There is evidence of air trapping.   There is evidence of hyperinflation.   Diffusion capacity is normal.  Disclaimer: This PFT has been interpreted based on Z-score/LLN/ULN criteria as described in ATS guidelines 2022.   Yashira Baez MD  [facility] Pulmonary Medicine  Office: [phone]

## 2024-11-22 ENCOUNTER — OFFICE VISIT (OUTPATIENT)
Age: 19
End: 2024-11-22
Payer: COMMERCIAL

## 2024-11-22 VITALS
RESPIRATION RATE: 16 BRPM | DIASTOLIC BLOOD PRESSURE: 68 MMHG | SYSTOLIC BLOOD PRESSURE: 134 MMHG | BODY MASS INDEX: 19.62 KG/M2 | OXYGEN SATURATION: 99 % | WEIGHT: 125 LBS | HEIGHT: 67 IN | HEART RATE: 79 BPM

## 2024-11-22 DIAGNOSIS — J45.40 MODERATE PERSISTENT ASTHMA WITHOUT COMPLICATION (HCC): Primary | ICD-10-CM

## 2024-11-22 PROCEDURE — 99214 OFFICE O/P EST MOD 30 MIN: CPT | Performed by: INTERNAL MEDICINE

## 2024-11-22 RX ORDER — MONTELUKAST SODIUM 10 MG/1
10 TABLET ORAL NIGHTLY
Qty: 30 TABLET | Refills: 6 | Status: SHIPPED | OUTPATIENT
Start: 2024-11-22

## 2024-11-22 RX ORDER — FLUTICASONE PROPIONATE AND SALMETEROL XINAFOATE 115; 21 UG/1; UG/1
2 AEROSOL, METERED RESPIRATORY (INHALATION) 2 TIMES DAILY
Qty: 12 G | Refills: 2 | Status: SHIPPED | OUTPATIENT
Start: 2024-11-22

## 2024-11-22 NOTE — PROGRESS NOTES
[facility] Pulmonary Follow Up Note    Chief Complaint:  Chief Complaint   Patient presents with    Follow - Up     Follow up on PFT 10/23       History of Present Illness:  Mihai Villalpando is a 19 year old male who presents today for follow up of shortness of breath.  This has been ongoing for several months started this past April/may.  They carry no formal diagnosis of asthma.  No cough, some occasional phlegm. Occasional wheezing/chest tightness. Patient is a never smoker or vaping.  No 2nd hand smoke exposure.  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  No pets.  .    Interval history:  Since last visit, patient started airsupra as needed with questionable improvement, sometimes it helps and sometimes it doesnt.  Had incidences where he had to go to online college because of breathing difficulties      Past Medical History:   Past Medical History:    Abdominal distention    Abdominal pain    Chest pain    Chest pain on exertion    Enlarged lymph node    Fatigue    Flatulence/gas pain/belching    Heart palpitations    Heartburn    Indigestion    Nausea    Problems with swallowing    Shortness of breath    Sleep disturbance    Uncomfortable fullness after meals    Weight loss    Wheezing        Past Surgical History:   History reviewed. No pertinent surgical history.    Family Medical History:   Family History   Problem Relation Age of Onset    Stroke Maternal Grandfather     Heart Attack Maternal Grandfather     Prostate Cancer Maternal Grandfather     Heart Disease Maternal Grandfather     Cancer Maternal Grandfather         Social History:   Social History     Socioeconomic History    Marital status: Single     Spouse name: Not on file    Number of children: Not on file    Years of education: Not on file    Highest education level: Not on file   Occupational History    Not on file   Tobacco Use    Smoking status: Never     Passive exposure: Never    Smokeless tobacco: Never   Substance and Sexual Activity    Alcohol  use: Yes     Comment: once per month    Drug use: Never    Sexual activity: Not on file   Other Topics Concern    Not on file   Social History Narrative    Not on file     Social Drivers of Health     Financial Resource Strain: Not on file   Food Insecurity: Not on file   Transportation Needs: Not on file   Physical Activity: Not on file   Stress: Not on file   Social Connections: Not on file   Housing Stability: Not on file        Medications:   Current Outpatient Medications   Medication Sig Dispense Refill    fluticasone-salmeterol (ADVAIR HFA) 115-21 MCG/ACT Inhalation Aerosol Inhale 2 puffs into the lungs 2 (two) times daily. 12 g 2    montelukast (SINGULAIR) 10 MG Oral Tab Take 1 tablet (10 mg total) by mouth nightly. 30 tablet 6    Albuterol-Budesonide (AIRSUPRA) 90-80 MCG/ACT Inhalation Aerosol Inhale 2 puffs into the lungs every 6 (six) hours as needed. 10.7 g 10    metoprolol tartrate 25 MG Oral Tab Take 0.5 tablets (12.5 mg total) by mouth 2 (two) times daily.      albuterol 108 (90 Base) MCG/ACT Inhalation Aero Soln Inhale 2 puffs into the lungs every 4 (four) hours as needed for Wheezing. 1 each 0    polyethylene glycol, PEG 3350-KCl-NaBcb-NaCl-NaSulf, 236 g Oral Recon Soln Take as directed by physician (Patient not taking: Reported on 11/22/2024) 4000 mL 0       Review of Systems: Review of Systems     Physical Exam:  /68 (BP Location: Right arm, Patient Position: Sitting, Cuff Size: adult)   Pulse 79   Resp 16   Ht 5' 7\" (1.702 m)   Wt 125 lb (56.7 kg)   SpO2 99%   BMI 19.58 kg/m²      Constitutional: alert, cooperative. No acute distress.  HEENT: Head NC/AT. Nares normal. Septum midline. Mucosa normal. No drainage or sinus tenderness.  Cardio: Regular rate and rhythm. Normal S1 and S2. No murmurs.   Respiratory: Thorax symmetrical with no labored breathing. clear to auscultation bilaterally  Extremities: No clubbing or cyanosis. No BLE edema.    Neurologic: A&Ox3. No gross motor  deficits.  Skin: Warm, dry  Psych: Calm, cooperative. Pleasant affect.    Results:  Personally reviewed  CT ABDOMEN+PELVIS(CONTRAST ONLY)(CPT=74177)  Narrative: PROCEDURE:  CT ABDOMEN+PELVIS (CONTRAST ONLY) (CPT=74177)     COMPARISON:  [facility] , CT, CT CARDIAC OVER READ, 7/02/2024, 8:17 AM.     INDICATIONS:  Pt c/o SOB for the past 6 months and symptoms are worse today. Pt feels more fatigue. Pt also c/o abd pain on the LLQ.     TECHNIQUE:  CT scanning was performed from the dome of the diaphragm to the pubic symphysis with non-ionic intravenous contrast material. Post contrast coronal MPR imaging was performed.  Dose reduction techniques were used. Dose information is   transmitted to the ACR (American College of Radiology) NRDR (National Radiology Data Registry) which includes the Dose Index Registry.     PATIENT STATED HISTORY:(As transcribed by Technologist)  Left lower quadrant pain for 1 month. fatique and nausea      CONTRAST USED:  65cc of Isovue 370     FINDINGS:    LIVER:  No enlargement, atrophy, abnormal density, or significant focal lesion.    BILIARY:  No visible dilatation or calcification.    PANCREAS:  No lesion, fluid collection, ductal dilatation, or atrophy.    SPLEEN:  No enlargement or focal lesion.    KIDNEYS:  No mass, obstruction, or calcification.  Mild prominence to the pelvocaliceal systems due to the markedly distended bladder.  No nephrolithiasis or suspicious mass.  Normal enhancement.  Slight prominence to the mid right ureter.  No ureteral   calculus noted  ADRENALS:  No mass or enlargement.    AORTA/VASCULAR:  No aneurysm or dissection.    RETROPERITONEUM:  No mass or adenopathy.    BOWEL/MESENTERY:  No visible mass, obstruction, or bowel wall thickening.  No free air, no free fluid.  Moderate stool burden.  Normal appendix.  ABDOMINAL WALL:  No mass or hernia.    URINARY BLADDER:  Moderate distention of the bladder without mass, calcification or wall thickening.  PELVIC NODES:  No  adenopathy.    PELVIC ORGANS:  No visible mass.  Pelvic organs appropriate for patient age.    BONES:  No bony lesion or fracture.    LUNG BASES:  No visible pulmonary or pleural disease.    OTHER:  Negative.                     Impression: CONCLUSION:    1. Moderately distended bladder with slight prominence to the pelvocaliceal cisterns and portions of the right mid ureter most likely related to the distended bladder.  Normal renal enhancement.  No nephrolithiasis.  No bladder mass.  2. Moderate stool burden without obstruction.  The appendix is normal.          LOCATION:  [id number]        Dictated by ([facility]): Bernard Edwards MD on 10/02/2024 at 7:48 AM       Finalized by (CST): Bernard Edwards MD on 10/02/2024 at 7:55 AM         PFTs:       No data to display                   No data to display                    WBC: 6.9, done on 10/23/2024.  HGB: 15.9, done on 10/23/2024.  PLT: 328, done on 10/23/2024.     Glucose: 102, done on 10/2/2024.  Cr: 0.9, done on 10/2/2024.  Last eGFR was 125 on 10/2/2024.  CA: 10.5, done on 10/2/2024.  Na: 141, done on 10/2/2024.  K: 3.5, done on 10/2/2024.  Cl: 105, done on 10/2/2024.  CO2: 24, done on 10/2/2024.  Last ALB was 5.2% done on 10/2/2024.     CT ABDOMEN+PELVIS(CONTRAST ONLY)(CPT=74177)    Result Date: 10/2/2024  CONCLUSION:  1. Moderately distended bladder with slight prominence to the pelvocaliceal cisterns and portions of the right mid ureter most likely related to the distended bladder.  Normal renal enhancement.  No nephrolithiasis.  No bladder mass. 2. Moderate stool burden without obstruction.  The appendix is normal.    LOCATION:  [id number]   Dictated by ([facility]): Bernard Edwards MD on 10/02/2024 at 7:48 AM     Finalized by ([facility]): Bernard Edwards MD on 10/02/2024 at 7:55 AM       US ABDOMEN COMPLETE (CPT=76700)    Result Date: 9/16/2024  CONCLUSION:  1. No acute intra-abdominal process is identified by sonographic technique. The etiology of the patient's  symptoms is unclear from this study.  2. Negative for hepatobiliary dilatation.  3. Sonographic features suggesting mild hepatic steatosis.  4. Lesser incidental findings as above.   elm-remote.   Dictated by (CST): Marko Mccain MD on 9/16/2024 at 11:38 PM     Finalized by (CST): Marko Mccain MD on 9/16/2024 at 11:39 PM            Assessment/Plan:  #1. Moderate persistent asthma, likely allergic component ,worsening of symptoms  Reviewed labs, imaging, and notes with patient  Reviewed PFTs with patient  Increase to advair or equivalent, airsupra as needed, and singulair  If still no improvement, then would consider biologics  All questions answered      Return in about 6 weeks (around 1/3/2025).    I spent 30 minutes obtaining and reviewing records, preparing for the visit including reviewing chart and prior testing, face to face time examining the patient and obtaining history, counseling, arranging and reviewing office-based testing, independently reviewing relevant studies and documentation exclusive of other billable procedures.      Yashira Baez MD  11/22/2024

## (undated) NOTE — LETTER
Date: 7/23/2024    Patient Name: Mihai Villalpando          To Whom it may concern:    This letter has been written at the patient's request. The above patient was seen at [facility] for treatment of a medical condition.    This patient should be excused from attending work from 07/19/2024 through 07/25/2024.    The patient may return to work/school on 07/25/2024                 Sincerely,    John Gibbs MD